# Patient Record
Sex: FEMALE | Race: WHITE | Employment: UNEMPLOYED | URBAN - METROPOLITAN AREA
[De-identification: names, ages, dates, MRNs, and addresses within clinical notes are randomized per-mention and may not be internally consistent; named-entity substitution may affect disease eponyms.]

---

## 2016-08-08 LAB
ANTIBODY SCREEN, EXTERNAL: NEGATIVE
CHLAMYDIA, EXTERNAL: NEGATIVE
HBSAG, EXTERNAL: NEGATIVE
HCT, EXTERNAL: 33.5
HGB, EXTERNAL: 11.3
HIV, EXTERNAL: NEGATIVE
N. GONORRHEA, EXTERNAL: NEGATIVE
RPR, EXTERNAL: NORMAL
RUBELLA, EXTERNAL: NORMAL
TYPE, ABO & RH, EXTERNAL: NORMAL

## 2017-01-02 ENCOUNTER — HOSPITAL ENCOUNTER (OUTPATIENT)
Age: 27
Setting detail: OBSERVATION
Discharge: HOME | End: 2017-01-03
Attending: STUDENT IN AN ORGANIZED HEALTH CARE EDUCATION/TRAINING PROGRAM | Admitting: STUDENT IN AN ORGANIZED HEALTH CARE EDUCATION/TRAINING PROGRAM
Payer: MEDICAID

## 2017-01-02 VITALS
BODY MASS INDEX: 19.46 KG/M2 | OXYGEN SATURATION: 98 % | HEART RATE: 66 BPM | RESPIRATION RATE: 16 BRPM | TEMPERATURE: 98.2 F | SYSTOLIC BLOOD PRESSURE: 107 MMHG | DIASTOLIC BLOOD PRESSURE: 61 MMHG | WEIGHT: 124 LBS | HEIGHT: 67 IN

## 2017-01-02 LAB
AMPHET UR QL SCN: NEGATIVE
APPEARANCE UR: CLEAR
BACTERIA URNS QL MICRO: ABNORMAL /HPF
BARBITURATES UR QL SCN: NEGATIVE
BENZODIAZ UR QL: NEGATIVE
BILIRUB UR QL: NEGATIVE
CANNABINOIDS UR QL SCN: NEGATIVE
COCAINE UR QL SCN: NEGATIVE
COLOR UR: ABNORMAL
DRUG SCRN COMMENT,DRGCM: ABNORMAL
EPITH CASTS URNS QL MICRO: ABNORMAL /LPF
GLUCOSE UR STRIP.AUTO-MCNC: NEGATIVE MG/DL
HGB UR QL STRIP: NEGATIVE
KETONES UR QL STRIP.AUTO: ABNORMAL MG/DL
LEUKOCYTE ESTERASE UR QL STRIP.AUTO: NEGATIVE
METHADONE UR QL: POSITIVE
NITRITE UR QL STRIP.AUTO: NEGATIVE
OPIATES UR QL: NEGATIVE
PCP UR QL: NEGATIVE
PH UR STRIP: 6 [PH] (ref 5–8)
PROT UR STRIP-MCNC: NEGATIVE MG/DL
RBC #/AREA URNS HPF: ABNORMAL /HPF (ref 0–5)
SP GR UR REFRACTOMETRY: 1.02 (ref 1–1.03)
UA: UC IF INDICATED,UAUC: ABNORMAL
UROBILINOGEN UR QL STRIP.AUTO: 1 EU/DL (ref 0.2–1)
WBC URNS QL MICRO: ABNORMAL /HPF (ref 0–4)

## 2017-01-02 PROCEDURE — 99218 HC RM OBSERVATION: CPT

## 2017-01-02 PROCEDURE — 59025 FETAL NON-STRESS TEST: CPT | Performed by: STUDENT IN AN ORGANIZED HEALTH CARE EDUCATION/TRAINING PROGRAM

## 2017-01-02 PROCEDURE — 81001 URINALYSIS AUTO W/SCOPE: CPT | Performed by: STUDENT IN AN ORGANIZED HEALTH CARE EDUCATION/TRAINING PROGRAM

## 2017-01-02 PROCEDURE — 75810000275 HC EMERGENCY DEPT VISIT NO LEVEL OF CARE

## 2017-01-02 PROCEDURE — 99284 EMERGENCY DEPT VISIT MOD MDM: CPT | Performed by: STUDENT IN AN ORGANIZED HEALTH CARE EDUCATION/TRAINING PROGRAM

## 2017-01-02 PROCEDURE — 80307 DRUG TEST PRSMV CHEM ANLYZR: CPT | Performed by: STUDENT IN AN ORGANIZED HEALTH CARE EDUCATION/TRAINING PROGRAM

## 2017-01-02 PROCEDURE — 87086 URINE CULTURE/COLONY COUNT: CPT | Performed by: STUDENT IN AN ORGANIZED HEALTH CARE EDUCATION/TRAINING PROGRAM

## 2017-01-02 PROCEDURE — 96372 THER/PROPH/DIAG INJ SC/IM: CPT | Performed by: STUDENT IN AN ORGANIZED HEALTH CARE EDUCATION/TRAINING PROGRAM

## 2017-01-02 RX ORDER — METHADONE HYDROCHLORIDE 10 MG/1
110 TABLET ORAL DAILY
COMMUNITY

## 2017-01-02 NOTE — IP AVS SNAPSHOT
Summary of Care Report The Summary of Care report has been created to help improve care coordination. Users with access to [employer] or [address] (Web-based application) may access additional patient information including the Discharge Summary. If you are not currently a [address] user and need more information, please call the number listed below in the [place] [address] section and ask to be connected with Medical Records. Facility Information Name Address Phone [address] [place] [id number] [phone] Patient Information Patient Name Sex  Felipa Serna ([id number]) Female 1990 Discharge Information Admitting Provider Service Area Unit Julita Virk, DO / [phone] [facility] 2 Labor & Delivery / [phone] Discharge Provider Discharge Date/Time Discharge Disposition Destination (none) 1/3/2017 02:15 (Pending) AHR (none) Patient Language Language ENGLISH [13] Problem List as of 1/3/2017  Date Reviewed: 3/6/2014 None You are allergic to the following Allergen Reactions Bactrim (Sulfamethoprim Ds) Rash Clindamycin Hives Current Discharge Medication List  
  
ASK your doctor about these medications Dose & Instructions Dispensing Information Comments HYDROcodone-acetaminophen 5-500 mg per tablet Commonly known as:  Doreatha Francisco Javier Dose:  1 Tab Take 1 Tab by mouth every six (6) hours as needed for Pain. Quantity:  20 Tab Refills:  0  
   
 methadone 10 mg tablet Commonly known as:  DOLOPHINE Dose:  110 mg Take 110 mg by mouth daily. Indications: OPIOID DEPENDENCE Refills:  0  
   
 polyethylene glycol 17 gram/dose powder Commonly known as:  Milka Reggie Dose:  17 g Take 17 g by mouth daily. 1 tablespoon with 8 oz of water daily Quantity:  255 g Refills:  0 PRENATAL DHA+COMPLETE PRENATAL -300 mg-mcg-mg Cmpk Generic drug:  PNV no.24-iron-folic acid-dha  
 Dose:  1 Tab Take 1 Tab by mouth daily. Refills:  0  
   
 TYLENOL ARTHRITIS PAIN 650 mg CR tablet Generic drug:  acetaminophen Dose:  650 mg Take 650 mg by mouth every six (6) hours as needed. Refills:  0 Follow-up Information Follow up With Details Comments Contact Info None   None (395) Patient stated that they have no PCP Discharge Instructions Please follow up with your doctor. Call tomorrow morning [phone] to make an appointment for Thursday. Dr. Paula Rodriguez will be expecting you. Week 37 of Your Pregnancy: Care Instructions Your Care Instructions You are near the end of your pregnancyand you're probably pretty uncomfortable. It may be harder to walk around. Lying down probably isn't comfortable either. You may have trouble getting to sleep or staying asleep. Most women deliver their babies between 40 and 41 weeks. This is a good time to think about packing a bag for the hospital with items you'll need. Then you'll be ready when labor starts. Follow-up care is a key part of your treatment and safety. Be sure to make and go to all appointments, and call your doctor if you are having problems. It's also a good idea to know your test results and keep a list of the medicines you take. How can you care for yourself at home? Learn about breastfeeding · Breastfeeding is best for your baby and good for you. · Breast milk has antibodies to help your baby fight infections. · Mothers who breastfeed often lose weight faster, because making milk burns calories. · Learning the best ways to hold your baby will make breastfeeding easier. · Let your partner bathe and diaper the baby to keep your partner from feeling left out. Snuggle together when you breastfeed. · You may want to learn how to use a breast pump and store your milk. · If you choose to bottle feed, make the feeding feel like breastfeeding so you can bond with your baby. Always hold your baby and the bottle. Do not prop bottles or let your baby fall asleep with a bottle. Learn about crying · It is common for babies to cry for 1 to 3 hours a day. Some cry more, some cry less. · Babies don't cry to make you upset or because you are a bad parent. · Crying is how your baby communicates. Your baby may be hungry; have gas; need a diaper change; or feel cold, warm, tired, lonely, or tense. Sometimes babies cry for unknown reasons. · If you respond to your baby's needs, he or she will learn to trust you. · Try to stay calm when your baby cries. Your baby may get more upset if he or she senses that you are upset. Know how to care for your  · Your baby's umbilical cord stump will drop off on its own, usually between 1 and 2 weeks. To care for your baby's umbilical cord area: ¨ Clean the area at the bottom of the cord 2 or 3 times a day. ¨ Pay special attention to the area where the cord attaches to the skin. ¨ Keep the diaper folded below the cord. ¨ Use a damp washcloth or cotton ball to sponge bathe your baby until the stump has come off. · Your baby's first dark stool is called meconium. After the meconium is passed, your baby will develop his or her own bowel pattern. ¨ Some babies, especially  babies, have several bowel movements a day. Others have one or two a day, or one every 2 to 3 days. ¨  babies often have loose, yellow stools. Formula-fed babies have more formed stools. ¨ If your baby's stools look like little pellets, he or she is constipated. After 2 days of constipation, call your baby's doctor. · If your baby will be circumcised, you can care for him at home. ¨ Gently rinse his penis with warm water after every diaper change.  Do not try to remove the film that forms on the penis. This film will go away on its own. Pat dry. ¨ Put petroleum ointment, such as Vaseline, on the area of the diaper that will touch your baby's penis. This will keep the diaper from sticking to your baby. ¨ Ask the doctor about giving your baby acetaminophen (Tylenol) for pain. Where can you learn more? Go to http://talon-jigar.info/. Enter 68 21 97 in the search box to learn more about \"Week 37 of Your Pregnancy: Care Instructions. \" Current as of: May 30, 2016 Content Version: 11.1 © 6845-1736 [employer]. Care instructions adapted under license by [employer] (which disclaims liability or warranty for this information). If you have questions about a medical condition or this instruction, always ask your healthcare professional. [place] [address] any warranty or liability for your use of this information. Counting Your Baby's Kicks: Care Instructions Your Care Instructions Counting your baby's kicks is one way your doctor can tell that your baby is healthy. Most womenespecially in a first pregnancyfeel their baby move for the first time between 16 and 22 weeks. The movement may feel like flutters rather than kicks. Your baby may move more at certain times of the day. When you are active, you may notice less kicking than when you are resting. At your prenatal visits, your doctor will ask whether the baby is active. In your last trimester, your doctor may ask you to count the number of times you feel your baby move. Follow-up care is a key part of your treatment and safety. Be sure to make and go to all appointments, and call your doctor if you are having problems. It's also a good idea to know your test results and keep a list of the medicines you take. How do you count fetal kicks?  
· A common method of checking your baby's movement is to count the number of kicks or moves you feel in 1 hour. Ten movements (such as kicks, flutters, or rolls) in 1 hour are normal. Some doctors suggest that you count in the morning until you get to 10 movements. Then you can quit for that day and start again the next day. · Pick your baby's most active time of day to count. This may be any time from morning to evening. · If you do not feel 10 movements in an hour, your baby may be sleeping. Wait for the next hour and count again. When should you call for help? Call your doctor now or seek immediate medical care if: 
· You noticed that your baby has stopped moving or is moving much less than normal. 
Watch closely for changes in your health, and be sure to contact your doctor if you have any problems. Where can you learn more? Go to http://talon-jigar.info/. Enter L285 in the search box to learn more about \"Counting Your Baby's Kicks: Care Instructions. \" Current as of: May 30, 2016 Content Version: 11.1 © 8110-7644 [employer]. Care instructions adapted under license by [employer] (which disclaims liability or warranty for this information). If you have questions about a medical condition or this instruction, always ask your healthcare professional. [address] any warranty or liability for your use of this information. Chart Review Routing History No Routing History on File

## 2017-01-02 NOTE — IP AVS SNAPSHOT
[address] [place] [address] [address] 
[phone] Patient: Farzana Thurston MRN: [id number] NUB:[birth year] You are allergic to the following Allergen Reactions Bactrim (Sulfamethoprim Ds) Rash Clindamycin Hives Recent Documentation Height Weight BMI OB Status Smoking Status 1.702 m 56.2 kg 19.42 kg/m2 Pregnant Current Every Day Smoker Unresulted Labs Order Current Status CULTURE, URINE In process Emergency Contacts Name Discharge Info Relation Home Work Mobile Ryley Baltazar [5] [phone] Ricky Fitzgerald  Other Relative [6] [phone] About your hospitalization You were admitted on:  January 2, 2017 You last received care in the:  [facility] 2 LABOR & DELIVERY You were discharged on:  January 3, 2017 Unit phone number:  [phone] Why you were hospitalized Your primary diagnosis was:  Not on File Providers Seen During Your Hospitalizations Provider Role Specialty Primary office phone Jolly Mishra DO Attending Provider Obstetrics & Gynecology [phone] Your Primary Care Physician (PCP) Primary Care Physician Office Phone Office Fax NONE ** None ** ** None ** Follow-up Information Follow up With Details Comments Contact Info None   None (395) Patient stated that they have no PCP Current Discharge Medication List  
  
ASK your doctor about these medications Dose & Instructions Dispensing Information Comments Morning Noon Evening Bedtime HYDROcodone-acetaminophen 5-500 mg per tablet Commonly known as:  Gale Salgado Your next dose is: Today, Tomorrow Other:  _________ Dose:  1 Tab Take 1 Tab by mouth every six (6) hours as needed for Pain. Quantity:  20 Tab Refills:  0  
     
   
   
   
  
 methadone 10 mg tablet Commonly known as:  DOLOPHINE  
 Your next dose is: Today, Tomorrow Other:  _________ Dose:  110 mg Take 110 mg by mouth daily. Indications: OPIOID DEPENDENCE Refills:  0  
     
   
   
   
  
 polyethylene glycol 17 gram/dose powder Commonly known as:  Moe Deck Your next dose is: Today, Tomorrow Other:  _________ Dose:  17 g Take 17 g by mouth daily. 1 tablespoon with 8 oz of water daily Quantity:  255 g Refills:  0 PRENATAL DHA+COMPLETE PRENATAL -300 mg-mcg-mg Cmpk Generic drug:  PNV no.24-iron-folic acid-dha Your next dose is: Today, Tomorrow Other:  _________ Dose:  1 Tab Take 1 Tab by mouth daily. Refills:  0  
     
   
   
   
  
 TYLENOL ARTHRITIS PAIN 650 mg CR tablet Generic drug:  acetaminophen Your next dose is: Today, Tomorrow Other:  _________ Dose:  650 mg Take 650 mg by mouth every six (6) hours as needed. Refills:  0 Discharge Instructions Please follow up with your doctor. Call tomorrow morning [phone] to make an appointment for Thursday. Dr. Suzette Newton will be expecting you. Week 37 of Your Pregnancy: Care Instructions Your Care Instructions You are near the end of your pregnancyand you're probably pretty uncomfortable. It may be harder to walk around. Lying down probably isn't comfortable either. You may have trouble getting to sleep or staying asleep. Most women deliver their babies between 40 and 41 weeks. This is a good time to think about packing a bag for the hospital with items you'll need. Then you'll be ready when labor starts. Follow-up care is a key part of your treatment and safety. Be sure to make and go to all appointments, and call your doctor if you are having problems. It's also a good idea to know your test results and keep a list of the medicines you take. How can you care for yourself at home? Learn about breastfeeding · Breastfeeding is best for your baby and good for you. · Breast milk has antibodies to help your baby fight infections. · Mothers who breastfeed often lose weight faster, because making milk burns calories. · Learning the best ways to hold your baby will make breastfeeding easier. · Let your partner bathe and diaper the baby to keep your partner from feeling left out. Snuggle together when you breastfeed. · You may want to learn how to use a breast pump and store your milk. · If you choose to bottle feed, make the feeding feel like breastfeeding so you can bond with your baby. Always hold your baby and the bottle. Do not prop bottles or let your baby fall asleep with a bottle. Learn about crying · It is common for babies to cry for 1 to 3 hours a day. Some cry more, some cry less. · Babies don't cry to make you upset or because you are a bad parent. · Crying is how your baby communicates. Your baby may be hungry; have gas; need a diaper change; or feel cold, warm, tired, lonely, or tense. Sometimes babies cry for unknown reasons. · If you respond to your baby's needs, he or she will learn to trust you. · Try to stay calm when your baby cries. Your baby may get more upset if he or she senses that you are upset. Know how to care for your  · Your baby's umbilical cord stump will drop off on its own, usually between 1 and 2 weeks. To care for your baby's umbilical cord area: ¨ Clean the area at the bottom of the cord 2 or 3 times a day. ¨ Pay special attention to the area where the cord attaches to the skin. ¨ Keep the diaper folded below the cord. ¨ Use a damp washcloth or cotton ball to sponge bathe your baby until the stump has come off. · Your baby's first dark stool is called meconium. After the meconium is passed, your baby will develop his or her own bowel pattern.  
¨ Some babies, especially  babies, have several bowel movements a day. Others have one or two a day, or one every 2 to 3 days. ¨  babies often have loose, yellow stools. Formula-fed babies have more formed stools. ¨ If your baby's stools look like little pellets, he or she is constipated. After 2 days of constipation, call your baby's doctor. · If your baby will be circumcised, you can care for him at home. ¨ Gently rinse his penis with warm water after every diaper change. Do not try to remove the film that forms on the penis. This film will go away on its own. Pat dry. ¨ Put petroleum ointment, such as Vaseline, on the area of the diaper that will touch your baby's penis. This will keep the diaper from sticking to your baby. ¨ Ask the doctor about giving your baby acetaminophen (Tylenol) for pain. Where can you learn more? Go to http://talonGreen Biofactoryjigar.info/. Enter 12 49 97 in the search box to learn more about \"Week 37 of Your Pregnancy: Care Instructions. \" Current as of: May 30, 2016 Content Version: 11.1 © 1050-8711 [employer]. Care instructions adapted under license by [employer] (which disclaims liability or warranty for this information). If you have questions about a medical condition or this instruction, always ask your healthcare professional. [place] [address] any warranty or liability for your use of this information. Counting Your Baby's Kicks: Care Instructions Your Care Instructions Counting your baby's kicks is one way your doctor can tell that your baby is healthy. Most womenespecially in a first pregnancyfeel their baby move for the first time between 16 and 22 weeks. The movement may feel like flutters rather than kicks. Your baby may move more at certain times of the day. When you are active, you may notice less kicking than when you are resting. At your prenatal visits, your doctor will ask whether the baby is active. In your last trimester, your doctor may ask you to count the number of times you feel your baby move. Follow-up care is a key part of your treatment and safety. Be sure to make and go to all appointments, and call your doctor if you are having problems. It's also a good idea to know your test results and keep a list of the medicines you take. How do you count fetal kicks? · A common method of checking your baby's movement is to count the number of kicks or moves you feel in 1 hour. Ten movements (such as kicks, flutters, or rolls) in 1 hour are normal. Some doctors suggest that you count in the morning until you get to 10 movements. Then you can quit for that day and start again the next day. · Pick your baby's most active time of day to count. This may be any time from morning to evening. · If you do not feel 10 movements in an hour, your baby may be sleeping. Wait for the next hour and count again. When should you call for help? Call your doctor now or seek immediate medical care if: 
· You noticed that your baby has stopped moving or is moving much less than normal. 
Watch closely for changes in your health, and be sure to contact your doctor if you have any problems. Where can you learn more? Go to http://talon-jigar.info/. Enter I669 in the search box to learn more about \"Counting Your Baby's Kicks: Care Instructions. \" Current as of: May 30, 2016 Content Version: 11.1 © 2109-4020 [employer]. Care instructions adapted under license by [employer] (which disclaims liability or warranty for this information). If you have questions about a medical condition or this instruction, always ask your healthcare professional. [place] [address] any warranty or liability for your use of this information. Discharge Orders None Introducing [facility]!    
 Ranjith Edwards introduces [facility] patient portal. Now you can access parts of your medical record, email your doctor's office, and request medication refills online. 1. In your internet browser, go to [email or web address] 2. Click on the First Time User? Click Here link in the Sign In box. You will see the New Member Sign Up page. 3. Enter your BetterYou Access Code exactly as it appears below. You will not need to use this code after youve completed the sign-up process. If you do not sign up before the expiration date, you must request a new code. · BetterYou Access Code: [email or web address] Expires: 4/3/2017  2:18 AM 
 
4. Enter the last four digits of your Social Security Number (xxxx) and Date of Birth (mm/dd/yyyy) as indicated and click Submit. You will be taken to the next sign-up page. 5. Create a BetterYou ID. This will be your BetterYou login ID and cannot be changed, so think of one that is secure and easy to remember. 6. Create a BetterYou password. You can change your password at any time. 7. Enter your Password Reset Question and Answer. This can be used at a later time if you forget your password. 8. Enter your e-mail address. You will receive e-mail notification when new information is available in [address] 9. Click Sign Up. You can now view and download portions of your medical record. 10. Click the Download Summary menu link to download a portable copy of your medical information. If you have questions, please visit the Frequently Asked Questions section of the BetterYou website. Remember, BetterYou is NOT to be used for urgent needs. For medical emergencies, dial 911. Now available from your iPhone and Android! General Information Please provide this summary of care documentation to your next provider. Patient Signature:  ____________________________________________________________ Date:  ____________________________________________________________  
  
[facility]  Provider Signature: ____________________________________________________________ Date:  ____________________________________________________________

## 2017-01-02 NOTE — IP AVS SNAPSHOT
Current Discharge Medication List  
  
ASK your doctor about these medications Dose & Instructions Dispensing Information Comments Morning Noon Evening Bedtime HYDROcodone-acetaminophen 5-500 mg per tablet Commonly known as:  Ramiro Munoz Your next dose is: Today, Tomorrow Other:  ____________ Dose:  1 Tab Take 1 Tab by mouth every six (6) hours as needed for Pain. Quantity:  20 Tab Refills:  0  
     
   
   
   
  
 methadone 10 mg tablet Commonly known as:  DOLOPHINE Your next dose is: Today, Tomorrow Other:  ____________ Dose:  110 mg Take 110 mg by mouth daily. Indications: OPIOID DEPENDENCE Refills:  0  
     
   
   
   
  
 polyethylene glycol 17 gram/dose powder Commonly known as:  Josephine Fail Your next dose is: Today, Tomorrow Other:  ____________ Dose:  17 g Take 17 g by mouth daily. 1 tablespoon with 8 oz of water daily Quantity:  255 g Refills:  0 PRENATAL DHA+COMPLETE PRENATAL -300 mg-mcg-mg Cmpk Generic drug:  PNV no.24-iron-folic acid-dha Your next dose is: Today, Tomorrow Other:  ____________ Dose:  1 Tab Take 1 Tab by mouth daily. Refills:  0  
     
   
   
   
  
 TYLENOL ARTHRITIS PAIN 650 mg CR tablet Generic drug:  acetaminophen Your next dose is: Today, Tomorrow Other:  ____________ Dose:  650 mg Take 650 mg by mouth every six (6) hours as needed. Refills:  0

## 2017-01-03 PROCEDURE — 74011250636 HC RX REV CODE- 250/636: Performed by: STUDENT IN AN ORGANIZED HEALTH CARE EDUCATION/TRAINING PROGRAM

## 2017-01-03 PROCEDURE — 99218 HC RM OBSERVATION: CPT

## 2017-01-03 PROCEDURE — 87081 CULTURE SCREEN ONLY: CPT | Performed by: STUDENT IN AN ORGANIZED HEALTH CARE EDUCATION/TRAINING PROGRAM

## 2017-01-03 PROCEDURE — 74011000250 HC RX REV CODE- 250: Performed by: STUDENT IN AN ORGANIZED HEALTH CARE EDUCATION/TRAINING PROGRAM

## 2017-01-03 RX ADMIN — LIDOCAINE HYDROCHLORIDE 1 G: 10 INJECTION, SOLUTION EPIDURAL; INFILTRATION; INTRACAUDAL; PERINEURAL at 02:02

## 2017-01-03 NOTE — PROGRESS NOTES
2200  Patient arrived on unit, urine sample obtained and patient placed on monitor. Discussed plan of care, let Dr Samy Mayo know she was here on the unit. Per Dr Samy Mayo ok to hydrate orally. Per patient she takes 110mg methadone daily via a methadone clinic. Dr Samy Mayo aware. Urine lab sent. 4150  Per Dr Samy Mayo, in light or recent lab results, give one dose of rocephin 1gm IM. Also obtain GBS swab.      0225  Discharge instructions given to patient, patient verbalized understanding.

## 2017-01-03 NOTE — PROGRESS NOTES
12- dr Carrie Chase notified of pt uc pattern, urine screen positive for methadone and pain scale, new orders to check pt cx and d/c to home with outpatient follow up  On 1-5-17.

## 2017-01-03 NOTE — DISCHARGE INSTRUCTIONS
Please follow up with your doctor. Call tomorrow morning [phone] to make an appointment for Thursday. Dr. Flower Lilly will be expecting you. Week 37 of Your Pregnancy: Care Instructions  Your Care Instructions    You are near the end of your pregnancy--and you're probably pretty uncomfortable. It may be harder to walk around. Lying down probably isn't comfortable either. You may have trouble getting to sleep or staying asleep. Most women deliver their babies between 40 and 41 weeks. This is a good time to think about packing a bag for the hospital with items you'll need. Then you'll be ready when labor starts. Follow-up care is a key part of your treatment and safety. Be sure to make and go to all appointments, and call your doctor if you are having problems. It's also a good idea to know your test results and keep a list of the medicines you take. How can you care for yourself at home? Learn about breastfeeding  · Breastfeeding is best for your baby and good for you. · Breast milk has antibodies to help your baby fight infections. · Mothers who breastfeed often lose weight faster, because making milk burns calories. · Learning the best ways to hold your baby will make breastfeeding easier. · Let your partner bathe and diaper the baby to keep your partner from feeling left out. Snuggle together when you breastfeed. · You may want to learn how to use a breast pump and store your milk. · If you choose to bottle feed, make the feeding feel like breastfeeding so you can bond with your baby. Always hold your baby and the bottle. Do not prop bottles or let your baby fall asleep with a bottle. Learn about crying  · It is common for babies to cry for 1 to 3 hours a day. Some cry more, some cry less. · Babies don't cry to make you upset or because you are a bad parent. · Crying is how your baby communicates.  Your baby may be hungry; have gas; need a diaper change; or feel cold, warm, tired, lonely, or tense. Sometimes babies cry for unknown reasons. · If you respond to your baby's needs, he or she will learn to trust you. · Try to stay calm when your baby cries. Your baby may get more upset if he or she senses that you are upset. Know how to care for your   · Your baby's umbilical cord stump will drop off on its own, usually between 1 and 2 weeks. To care for your baby's umbilical cord area:  ¨ Clean the area at the bottom of the cord 2 or 3 times a day. ¨ Pay special attention to the area where the cord attaches to the skin. ¨ Keep the diaper folded below the cord. ¨ Use a damp washcloth or cotton ball to sponge bathe your baby until the stump has come off. · Your baby's first dark stool is called meconium. After the meconium is passed, your baby will develop his or her own bowel pattern. ¨ Some babies, especially  babies, have several bowel movements a day. Others have one or two a day, or one every 2 to 3 days. ¨  babies often have loose, yellow stools. Formula-fed babies have more formed stools. ¨ If your baby's stools look like little pellets, he or she is constipated. After 2 days of constipation, call your baby's doctor. · If your baby will be circumcised, you can care for him at home. ¨ Gently rinse his penis with warm water after every diaper change. Do not try to remove the film that forms on the penis. This film will go away on its own. Pat dry. ¨ Put petroleum ointment, such as Vaseline, on the area of the diaper that will touch your baby's penis. This will keep the diaper from sticking to your baby. ¨ Ask the doctor about giving your baby acetaminophen (Tylenol) for pain. Where can you learn more? Go to http://talon-jigar.info/. Enter 08 54 [id number] in the search box to learn more about \"Week 37 of Your Pregnancy: Care Instructions. \"  Current as of: May 30, 2016  Content Version: 11.1  © 8845-6837 [employer].  Care instructions adapted under license by [employer] (which disclaims liability or warranty for this information). If you have questions about a medical condition or this instruction, always ask your healthcare professional. [address] any warranty or liability for your use of this information. Counting Your Baby's Kicks: Care Instructions  Your Care Instructions  Counting your baby's kicks is one way your doctor can tell that your baby is healthy. Most women--especially in a first pregnancy--feel their baby move for the first time between 16 and 22 weeks. The movement may feel like flutters rather than kicks. Your baby may move more at certain times of the day. When you are active, you may notice less kicking than when you are resting. At your prenatal visits, your doctor will ask whether the baby is active. In your last trimester, your doctor may ask you to count the number of times you feel your baby move. Follow-up care is a key part of your treatment and safety. Be sure to make and go to all appointments, and call your doctor if you are having problems. It's also a good idea to know your test results and keep a list of the medicines you take. How do you count fetal kicks? · A common method of checking your baby's movement is to count the number of kicks or moves you feel in 1 hour. Ten movements (such as kicks, flutters, or rolls) in 1 hour are normal. Some doctors suggest that you count in the morning until you get to 10 movements. Then you can quit for that day and start again the next day. · Pick your baby's most active time of day to count. This may be any time from morning to evening. · If you do not feel 10 movements in an hour, your baby may be sleeping. Wait for the next hour and count again. When should you call for help?   Call your doctor now or seek immediate medical care if:  · You noticed that your baby has stopped moving or is moving much less than normal.  Watch closely for changes in your health, and be sure to contact your doctor if you have any problems. Where can you learn more? Go to http://talon-jigar.info/. Enter D703 in the search box to learn more about \"Counting Your Baby's Kicks: Care Instructions. \"  Current as of: May 30, 2016  Content Version: 11.1  © 6489-2088 [employer]. Care instructions adapted under license by [employer] (which disclaims liability or warranty for this information). If you have questions about a medical condition or this instruction, always ask your healthcare professional. [address] any warranty or liability for your use of this information.

## 2017-01-04 ENCOUNTER — ANESTHESIA EVENT (OUTPATIENT)
Dept: LABOR AND DELIVERY | Age: 27
DRG: 540 | End: 2017-01-04
Payer: MEDICAID

## 2017-01-04 ENCOUNTER — HOSPITAL ENCOUNTER (INPATIENT)
Age: 27
LOS: 3 days | Discharge: HOME | DRG: 540 | End: 2017-01-07
Attending: STUDENT IN AN ORGANIZED HEALTH CARE EDUCATION/TRAINING PROGRAM | Admitting: OBSTETRICS & GYNECOLOGY
Payer: MEDICAID

## 2017-01-04 ENCOUNTER — ANESTHESIA (OUTPATIENT)
Dept: LABOR AND DELIVERY | Age: 27
DRG: 540 | End: 2017-01-04
Payer: MEDICAID

## 2017-01-04 PROBLEM — Z34.90 PREGNANT: Status: ACTIVE | Noted: 2017-01-04

## 2017-01-04 LAB
AMPHET UR QL SCN: NEGATIVE
BACTERIA SPEC CULT: NORMAL
BARBITURATES UR QL SCN: NEGATIVE
BASOPHILS # BLD AUTO: 0 K/UL (ref 0–0.1)
BASOPHILS # BLD: 0 % (ref 0–1)
BENZODIAZ UR QL: NEGATIVE
CANNABINOIDS UR QL SCN: NEGATIVE
CC UR VC: NORMAL
COCAINE UR QL SCN: NEGATIVE
DRUG SCRN COMMENT,DRGCM: ABNORMAL
EOSINOPHIL # BLD: 0.1 K/UL (ref 0–0.4)
EOSINOPHIL NFR BLD: 1 % (ref 0–7)
ERYTHROCYTE [DISTWIDTH] IN BLOOD BY AUTOMATED COUNT: 13.3 % (ref 11.5–14.5)
HCT VFR BLD AUTO: 39.1 % (ref 35–47)
HGB BLD-MCNC: 14 G/DL (ref 11.5–16)
LYMPHOCYTES # BLD AUTO: 13 % (ref 12–49)
LYMPHOCYTES # BLD: 2.8 K/UL (ref 0.8–3.5)
MCH RBC QN AUTO: 28.7 PG (ref 26–34)
MCHC RBC AUTO-ENTMCNC: 35.8 G/DL (ref 30–36.5)
MCV RBC AUTO: 80.3 FL (ref 80–99)
METHADONE UR QL: POSITIVE
MONOCYTES # BLD: 1 K/UL (ref 0–1)
MONOCYTES NFR BLD AUTO: 5 % (ref 5–13)
NEUTS SEG # BLD: 17.6 K/UL (ref 1.8–8)
NEUTS SEG NFR BLD AUTO: 81 % (ref 32–75)
OPIATES UR QL: NEGATIVE
PCP UR QL: NEGATIVE
PLATELET # BLD AUTO: 335 K/UL (ref 150–400)
RBC # BLD AUTO: 4.87 M/UL (ref 3.8–5.2)
SERVICE CMNT-IMP: NORMAL
WBC # BLD AUTO: 21.5 K/UL (ref 3.6–11)

## 2017-01-04 PROCEDURE — 80307 DRUG TEST PRSMV CHEM ANLYZR: CPT | Performed by: OBSTETRICS & GYNECOLOGY

## 2017-01-04 PROCEDURE — 74011250636 HC RX REV CODE- 250/636: Performed by: OBSTETRICS & GYNECOLOGY

## 2017-01-04 PROCEDURE — 74011250636 HC RX REV CODE- 250/636

## 2017-01-04 PROCEDURE — 75410000003 HC RECOV DEL/VAG/CSECN EA 0.5 HR: Performed by: OBSTETRICS & GYNECOLOGY

## 2017-01-04 PROCEDURE — 77030034850

## 2017-01-04 PROCEDURE — 74011000250 HC RX REV CODE- 250

## 2017-01-04 PROCEDURE — 74011000250 HC RX REV CODE- 250: Performed by: OBSTETRICS & GYNECOLOGY

## 2017-01-04 PROCEDURE — 36415 COLL VENOUS BLD VENIPUNCTURE: CPT | Performed by: OBSTETRICS & GYNECOLOGY

## 2017-01-04 PROCEDURE — 85025 COMPLETE CBC W/AUTO DIFF WBC: CPT | Performed by: OBSTETRICS & GYNECOLOGY

## 2017-01-04 PROCEDURE — 76010000391 HC C SECN FIRST 1 HR: Performed by: OBSTETRICS & GYNECOLOGY

## 2017-01-04 PROCEDURE — 77010026064 HC OXYGEN INFANT MED AIR MIN: Performed by: OBSTETRICS & GYNECOLOGY

## 2017-01-04 PROCEDURE — 74011250637 HC RX REV CODE- 250/637: Performed by: OBSTETRICS & GYNECOLOGY

## 2017-01-04 PROCEDURE — 74011250636 HC RX REV CODE- 250/636: Performed by: ANESTHESIOLOGY

## 2017-01-04 PROCEDURE — 99218 HC RM OBSERVATION: CPT

## 2017-01-04 PROCEDURE — 77030014125 HC TY EPDRL BBMI -B: Performed by: ANESTHESIOLOGY

## 2017-01-04 PROCEDURE — 77010026065 HC OXYGEN MINIMUM MEDICAL AIR: Performed by: OBSTETRICS & GYNECOLOGY

## 2017-01-04 PROCEDURE — 88307 TISSUE EXAM BY PATHOLOGIST: CPT | Performed by: OBSTETRICS & GYNECOLOGY

## 2017-01-04 PROCEDURE — 99281 EMR DPT VST MAYX REQ PHY/QHP: CPT | Performed by: STUDENT IN AN ORGANIZED HEALTH CARE EDUCATION/TRAINING PROGRAM

## 2017-01-04 PROCEDURE — 75810000275 HC EMERGENCY DEPT VISIT NO LEVEL OF CARE

## 2017-01-04 PROCEDURE — 75410000002 HC LABOR FEE PER 1 HR: Performed by: OBSTETRICS & GYNECOLOGY

## 2017-01-04 PROCEDURE — 76060000078 HC EPIDURAL ANESTHESIA: Performed by: OBSTETRICS & GYNECOLOGY

## 2017-01-04 PROCEDURE — 65270000029 HC RM PRIVATE

## 2017-01-04 PROCEDURE — 76815 OB US LIMITED FETUS(S): CPT | Performed by: OBSTETRICS & GYNECOLOGY

## 2017-01-04 RX ORDER — NALOXONE HYDROCHLORIDE 0.4 MG/ML
0.4 INJECTION, SOLUTION INTRAMUSCULAR; INTRAVENOUS; SUBCUTANEOUS AS NEEDED
Status: DISCONTINUED | OUTPATIENT
Start: 2017-01-04 | End: 2017-01-07 | Stop reason: HOSPADM

## 2017-01-04 RX ORDER — OXYTOCIN 10 [USP'U]/ML
INJECTION, SOLUTION INTRAMUSCULAR; INTRAVENOUS AS NEEDED
Status: DISCONTINUED | OUTPATIENT
Start: 2017-01-04 | End: 2017-01-04 | Stop reason: HOSPADM

## 2017-01-04 RX ORDER — SODIUM CHLORIDE, SODIUM LACTATE, POTASSIUM CHLORIDE, CALCIUM CHLORIDE 600; 310; 30; 20 MG/100ML; MG/100ML; MG/100ML; MG/100ML
125 INJECTION, SOLUTION INTRAVENOUS CONTINUOUS
Status: DISCONTINUED | OUTPATIENT
Start: 2017-01-04 | End: 2017-01-04

## 2017-01-04 RX ORDER — SODIUM CHLORIDE 0.9 % (FLUSH) 0.9 %
5-10 SYRINGE (ML) INJECTION AS NEEDED
Status: DISCONTINUED | OUTPATIENT
Start: 2017-01-04 | End: 2017-01-07 | Stop reason: HOSPADM

## 2017-01-04 RX ORDER — BUPIVACAINE HYDROCHLORIDE 2.5 MG/ML
INJECTION, SOLUTION EPIDURAL; INFILTRATION; INTRACAUDAL AS NEEDED
Status: DISCONTINUED | OUTPATIENT
Start: 2017-01-04 | End: 2017-01-04 | Stop reason: HOSPADM

## 2017-01-04 RX ORDER — DOCUSATE SODIUM 100 MG/1
100 CAPSULE, LIQUID FILLED ORAL 2 TIMES DAILY
Status: DISCONTINUED | OUTPATIENT
Start: 2017-01-04 | End: 2017-01-07 | Stop reason: HOSPADM

## 2017-01-04 RX ORDER — CEFAZOLIN SODIUM IN 0.9 % NACL 2 G/50 ML
2 INTRAVENOUS SOLUTION, PIGGYBACK (ML) INTRAVENOUS ONCE
Status: COMPLETED | OUTPATIENT
Start: 2017-01-04 | End: 2017-01-04

## 2017-01-04 RX ORDER — SODIUM CHLORIDE 0.9 % (FLUSH) 0.9 %
5-10 SYRINGE (ML) INJECTION EVERY 8 HOURS
Status: DISCONTINUED | OUTPATIENT
Start: 2017-01-04 | End: 2017-01-04

## 2017-01-04 RX ORDER — NALOXONE HYDROCHLORIDE 0.4 MG/ML
0.4 INJECTION, SOLUTION INTRAMUSCULAR; INTRAVENOUS; SUBCUTANEOUS AS NEEDED
Status: DISCONTINUED | OUTPATIENT
Start: 2017-01-04 | End: 2017-01-04 | Stop reason: SDUPTHER

## 2017-01-04 RX ORDER — BUPIVACAINE HYDROCHLORIDE 5 MG/ML
10 INJECTION, SOLUTION EPIDURAL; INTRACAUDAL ONCE
Status: DISCONTINUED | OUTPATIENT
Start: 2017-01-04 | End: 2017-01-04

## 2017-01-04 RX ORDER — LIDOCAINE HYDROCHLORIDE AND EPINEPHRINE 20; 5 MG/ML; UG/ML
INJECTION, SOLUTION EPIDURAL; INFILTRATION; INTRACAUDAL; PERINEURAL AS NEEDED
Status: DISCONTINUED | OUTPATIENT
Start: 2017-01-04 | End: 2017-01-04 | Stop reason: HOSPADM

## 2017-01-04 RX ORDER — SODIUM CHLORIDE 0.9 % (FLUSH) 0.9 %
5-10 SYRINGE (ML) INJECTION EVERY 8 HOURS
Status: DISCONTINUED | OUTPATIENT
Start: 2017-01-04 | End: 2017-01-04 | Stop reason: HOSPADM

## 2017-01-04 RX ORDER — KETOROLAC TROMETHAMINE 30 MG/ML
30 INJECTION, SOLUTION INTRAMUSCULAR; INTRAVENOUS
Status: DISCONTINUED | OUTPATIENT
Start: 2017-01-04 | End: 2017-01-04

## 2017-01-04 RX ORDER — METHADONE HYDROCHLORIDE 10 MG/ML
110 CONCENTRATE ORAL DAILY
Status: DISCONTINUED | OUTPATIENT
Start: 2017-01-04 | End: 2017-01-07 | Stop reason: HOSPADM

## 2017-01-04 RX ORDER — ACETAMINOPHEN 325 MG/1
650 TABLET ORAL
Status: DISCONTINUED | OUTPATIENT
Start: 2017-01-04 | End: 2017-01-07 | Stop reason: HOSPADM

## 2017-01-04 RX ORDER — FENTANYL/BUPIVACAINE/NS/PF 2-1250MCG
PREFILLED PUMP RESERVOIR EPIDURAL
Status: DISCONTINUED
Start: 2017-01-04 | End: 2017-01-04

## 2017-01-04 RX ORDER — HYDROCODONE BITARTRATE AND ACETAMINOPHEN 5; 325 MG/1; MG/1
2 TABLET ORAL
Status: DISCONTINUED | OUTPATIENT
Start: 2017-01-04 | End: 2017-01-07 | Stop reason: HOSPADM

## 2017-01-04 RX ORDER — SODIUM CHLORIDE 0.9 % (FLUSH) 0.9 %
5-10 SYRINGE (ML) INJECTION AS NEEDED
Status: DISCONTINUED | OUTPATIENT
Start: 2017-01-04 | End: 2017-01-04

## 2017-01-04 RX ORDER — METHADONE HYDROCHLORIDE 10 MG/1
110 TABLET ORAL DAILY
Status: DISCONTINUED | OUTPATIENT
Start: 2017-01-04 | End: 2017-01-04

## 2017-01-04 RX ORDER — SODIUM CHLORIDE, SODIUM LACTATE, POTASSIUM CHLORIDE, CALCIUM CHLORIDE 600; 310; 30; 20 MG/100ML; MG/100ML; MG/100ML; MG/100ML
125 INJECTION, SOLUTION INTRAVENOUS CONTINUOUS
Status: DISCONTINUED | OUTPATIENT
Start: 2017-01-04 | End: 2017-01-07 | Stop reason: HOSPADM

## 2017-01-04 RX ORDER — NALOXONE HYDROCHLORIDE 0.4 MG/ML
0.4 INJECTION, SOLUTION INTRAMUSCULAR; INTRAVENOUS; SUBCUTANEOUS AS NEEDED
Status: DISCONTINUED | OUTPATIENT
Start: 2017-01-04 | End: 2017-01-04 | Stop reason: HOSPADM

## 2017-01-04 RX ORDER — ZOLPIDEM TARTRATE 5 MG/1
5 TABLET ORAL
Status: DISCONTINUED | OUTPATIENT
Start: 2017-01-05 | End: 2017-01-07 | Stop reason: HOSPADM

## 2017-01-04 RX ORDER — SODIUM CHLORIDE, SODIUM LACTATE, POTASSIUM CHLORIDE, CALCIUM CHLORIDE 600; 310; 30; 20 MG/100ML; MG/100ML; MG/100ML; MG/100ML
1000 INJECTION, SOLUTION INTRAVENOUS CONTINUOUS
Status: DISCONTINUED | OUTPATIENT
Start: 2017-01-04 | End: 2017-01-04 | Stop reason: HOSPADM

## 2017-01-04 RX ORDER — SODIUM CHLORIDE, SODIUM LACTATE, POTASSIUM CHLORIDE, CALCIUM CHLORIDE 600; 310; 30; 20 MG/100ML; MG/100ML; MG/100ML; MG/100ML
100 INJECTION, SOLUTION INTRAVENOUS CONTINUOUS
Status: DISCONTINUED | OUTPATIENT
Start: 2017-01-04 | End: 2017-01-04

## 2017-01-04 RX ORDER — IBUPROFEN 800 MG/1
800 TABLET ORAL EVERY 8 HOURS
Status: DISCONTINUED | OUTPATIENT
Start: 2017-01-04 | End: 2017-01-07 | Stop reason: HOSPADM

## 2017-01-04 RX ORDER — MORPHINE SULFATE 0.5 MG/ML
INJECTION, SOLUTION EPIDURAL; INTRATHECAL; INTRAVENOUS AS NEEDED
Status: DISCONTINUED | OUTPATIENT
Start: 2017-01-04 | End: 2017-01-04 | Stop reason: HOSPADM

## 2017-01-04 RX ORDER — OXYTOCIN IN 5 % DEXTROSE 30/500 ML
1-25 PLASTIC BAG, INJECTION (ML) INTRAVENOUS
Status: DISCONTINUED | OUTPATIENT
Start: 2017-01-04 | End: 2017-01-04

## 2017-01-04 RX ORDER — ONDANSETRON 2 MG/ML
INJECTION INTRAMUSCULAR; INTRAVENOUS AS NEEDED
Status: DISCONTINUED | OUTPATIENT
Start: 2017-01-04 | End: 2017-01-04 | Stop reason: HOSPADM

## 2017-01-04 RX ORDER — SODIUM CHLORIDE 0.9 % (FLUSH) 0.9 %
5-10 SYRINGE (ML) INJECTION EVERY 8 HOURS
Status: DISCONTINUED | OUTPATIENT
Start: 2017-01-04 | End: 2017-01-07 | Stop reason: HOSPADM

## 2017-01-04 RX ORDER — CEFAZOLIN SODIUM IN 0.9 % NACL 2 G/50 ML
INTRAVENOUS SOLUTION, PIGGYBACK (ML) INTRAVENOUS
Status: DISCONTINUED
Start: 2017-01-04 | End: 2017-01-04

## 2017-01-04 RX ORDER — KETOROLAC TROMETHAMINE 30 MG/ML
30 INJECTION, SOLUTION INTRAMUSCULAR; INTRAVENOUS EVERY 6 HOURS
Status: COMPLETED | OUTPATIENT
Start: 2017-01-04 | End: 2017-01-05

## 2017-01-04 RX ORDER — SODIUM CHLORIDE 0.9 % (FLUSH) 0.9 %
5-10 SYRINGE (ML) INJECTION AS NEEDED
Status: DISCONTINUED | OUTPATIENT
Start: 2017-01-04 | End: 2017-01-04 | Stop reason: HOSPADM

## 2017-01-04 RX ORDER — FENTANYL/BUPIVACAINE/NS/PF 2-1250MCG
1-16 PREFILLED PUMP RESERVOIR EPIDURAL CONTINUOUS
Status: DISCONTINUED | OUTPATIENT
Start: 2017-01-04 | End: 2017-01-04

## 2017-01-04 RX ORDER — SIMETHICONE 80 MG
80 TABLET,CHEWABLE ORAL AS NEEDED
Status: DISCONTINUED | OUTPATIENT
Start: 2017-01-04 | End: 2017-01-07 | Stop reason: HOSPADM

## 2017-01-04 RX ORDER — DIPHENHYDRAMINE HCL 25 MG
25 CAPSULE ORAL
Status: DISCONTINUED | OUTPATIENT
Start: 2017-01-04 | End: 2017-01-07 | Stop reason: HOSPADM

## 2017-01-04 RX ORDER — OXYTOCIN IN 5 % DEXTROSE 30/500 ML
1-25 PLASTIC BAG, INJECTION (ML) INTRAVENOUS
Status: DISCONTINUED | OUTPATIENT
Start: 2017-01-04 | End: 2017-01-07 | Stop reason: HOSPADM

## 2017-01-04 RX ORDER — SODIUM CHLORIDE, SODIUM LACTATE, POTASSIUM CHLORIDE, CALCIUM CHLORIDE 600; 310; 30; 20 MG/100ML; MG/100ML; MG/100ML; MG/100ML
125 INJECTION, SOLUTION INTRAVENOUS CONTINUOUS
Status: DISPENSED | OUTPATIENT
Start: 2017-01-04 | End: 2017-01-05

## 2017-01-04 RX ORDER — OXYTOCIN/RINGER'S LACTATE 20/1000 ML
125-500 PLASTIC BAG, INJECTION (ML) INTRAVENOUS ONCE
Status: ACTIVE | OUTPATIENT
Start: 2017-01-04 | End: 2017-01-04

## 2017-01-04 RX ORDER — ONDANSETRON 2 MG/ML
4 INJECTION INTRAMUSCULAR; INTRAVENOUS
Status: DISCONTINUED | OUTPATIENT
Start: 2017-01-04 | End: 2017-01-04 | Stop reason: HOSPADM

## 2017-01-04 RX ADMIN — DOCUSATE SODIUM 100 MG: 100 CAPSULE ORAL at 10:25

## 2017-01-04 RX ADMIN — KETOROLAC TROMETHAMINE 30 MG: 30 INJECTION, SOLUTION INTRAMUSCULAR at 06:28

## 2017-01-04 RX ADMIN — ONDANSETRON 4 MG: 2 INJECTION INTRAMUSCULAR; INTRAVENOUS at 02:21

## 2017-01-04 RX ADMIN — MORPHINE SULFATE 2.5 MG: 0.5 INJECTION, SOLUTION EPIDURAL; INTRATHECAL; INTRAVENOUS at 02:37

## 2017-01-04 RX ADMIN — KETOROLAC TROMETHAMINE 30 MG: 30 INJECTION, SOLUTION INTRAMUSCULAR at 18:53

## 2017-01-04 RX ADMIN — CEFAZOLIN 2 G: 1 INJECTION, POWDER, FOR SOLUTION INTRAMUSCULAR; INTRAVENOUS; PARENTERAL at 02:21

## 2017-01-04 RX ADMIN — BUPIVACAINE HYDROCHLORIDE 20 ML: 5 INJECTION, SOLUTION EPIDURAL; INTRACAUDAL; PERINEURAL at 02:52

## 2017-01-04 RX ADMIN — OXYTOCIN 40 UNITS: 10 INJECTION, SOLUTION INTRAMUSCULAR; INTRAVENOUS at 02:36

## 2017-01-04 RX ADMIN — KETOROLAC TROMETHAMINE 30 MG: 30 INJECTION, SOLUTION INTRAMUSCULAR at 11:38

## 2017-01-04 RX ADMIN — MORPHINE SULFATE 2.5 MG: 0.5 INJECTION, SOLUTION EPIDURAL; INTRATHECAL; INTRAVENOUS at 02:36

## 2017-01-04 RX ADMIN — METHADONE HYDROCHLORIDE 110 MG: 10 CONCENTRATE ORAL at 09:14

## 2017-01-04 RX ADMIN — BUPIVACAINE HYDROCHLORIDE 10 ML: 2.5 INJECTION, SOLUTION EPIDURAL; INFILTRATION; INTRACAUDAL at 00:59

## 2017-01-04 RX ADMIN — FENTANYL 0.2 MG/100ML-BUPIV 0.125%-NACL 0.9% EPIDURAL INJ 10 ML/HR: 2/0.125 SOLUTION at 01:12

## 2017-01-04 RX ADMIN — SODIUM CHLORIDE, SODIUM LACTATE, POTASSIUM CHLORIDE, AND CALCIUM CHLORIDE 125 ML/HR: 600; 310; 30; 20 INJECTION, SOLUTION INTRAVENOUS at 01:33

## 2017-01-04 RX ADMIN — SODIUM CHLORIDE, SODIUM LACTATE, POTASSIUM CHLORIDE, AND CALCIUM CHLORIDE: 600; 310; 30; 20 INJECTION, SOLUTION INTRAVENOUS at 00:29

## 2017-01-04 RX ADMIN — CEFAZOLIN 2 G: 1 INJECTION, POWDER, FOR SOLUTION INTRAMUSCULAR; INTRAVENOUS; PARENTERAL at 02:07

## 2017-01-04 RX ADMIN — LIDOCAINE HYDROCHLORIDE AND EPINEPHRINE 15 ML: 20; 5 INJECTION, SOLUTION EPIDURAL; INFILTRATION; INTRACAUDAL; PERINEURAL at 02:18

## 2017-01-04 RX ADMIN — Medication 5 ML: at 22:00

## 2017-01-04 RX ADMIN — HYDROCODONE BITARTRATE AND ACETAMINOPHEN 2 TABLET: 5; 325 TABLET ORAL at 16:12

## 2017-01-04 NOTE — PROGRESS NOTES
0030  Pt offered nitrous oxide for pain relief in labor. Pt states to RN and staff, \"i dont like this! It feels like im suffocating!   Someone needs to help me!\"

## 2017-01-04 NOTE — DISCHARGE SUMMARY
Patient ID:  [facility]  [id number]  31 y.o.  1990    Admit Date: 2017    Discharge Date:     Admitting Physician: Alistair Harden MD    Attending Physician: Henrique Perez DO    Admission Diagnoses: Rule out Labor  Pregnant    Procedures for this admission: Procedure(s):   SECTION    Discharge Diagnoses: Same as above with primary LTCS producing a viable infant. Information for the patient's : Darleen Beltran, Female [[id number]]   One Minute Apgar: 8 (Filed from Delivery Summary)  Five  Minute Apgar: 9 (Filed from Delivery Summary)   4lb 14oz marissa breech  Scant fluid  Normal uterine anatomy     Discharge Disposition:  good    Discharge Condition:  good    Additional Diagnoses: IUP 37wks active labor marissa breech methadone maint. scant care/ poor compliance. Maternal Labs: No results found for: HBSAGEXT, HIVEXT, RUBELLAEXT, RPREXT, GRBSEXT    Cord Blood Results:   Information for the patient's : Darleen Beltran, Female [[id number]]   No results found for: PCTABR, PCTDIG, BILI, ABORH          History of Present Illness:   OB History      Para Term  AB TAB SAB Ectopic Multiple Living    1                 Admitted for active labor. Hospital Course:   Patient was admitted as above and delivered via primary LTCS . Please the chart for details. The postpartum course was unremarkable. She was deemed stable for discharge home on day 3.     Follow-up Care: 7-10days        Signed:  Alistair Harden MD  2017  2:55 AM

## 2017-01-04 NOTE — L&D DELIVERY NOTE
Operative Note    Name: Zachary Andre   Medical Record Number: [id number]   YOB: 1990  Today's Date: 2017      Pre-operative Diagnosis: IUP 37wks active labor marissa breech methadone use poor compliance/ scant care    Post-operative Diagnosis: same, delivered    Procedure: primary LTCS    Surgeon(s):  Tino Valera MD    Anesthesia: epidural     EBL: 500cc    Prophylactic Antibiotics: Ancef  DVT Prophylaxis: Sequential Compression Devices         Fetal Description: katz     Birth Information:   Information for the patient's : Preethi Colindres, Female [[id number]]   Delivery of a 2.211 kg Female [1] infant on 2017 at 2:34 AM. Apgars were 8 and 9. Umbilical Cord:     Umbilical Cord Events:     Placenta:  removal with  appearance.     Amniotic Fluid Volume:  Oligohydramic     Amniotic Fluid Description:  Clear        Umbilical Cord: 3 vessels present    Placenta:  manual removal    Additional intraoperative findings: marissa breech viable female    Specimens: placenta/ cord gas 3.01           Complications: none    Stable to PACU    Tino Valera MD  2017  3:01 AM

## 2017-01-04 NOTE — PROGRESS NOTES
Problem: Lactation Care Plan  Goal: *Infant latching appropriately  Outcome: Progressing Towards Goal  Pt will successfully establish breastfeeding by feeding in response to infant's early feeding cues and/or to offer breast every 2-3 hours. Ways to obtain a deep latch and seek comfortable positioning shared, aware to keep log of feedings/output. Goal: *Weight loss less than 10% of birth weight  Outcome: Progressing Towards Goal  Encouraged mom to attempt feeding every 2-3 hours in the first couple of days. Looking for 8-12 feedings in 24 hours. Don't limit baby at breast, allow baby to come of breast on it's own. Baby may want to feed more often then every 2-3 hours. Baby may not feed that often, but feedings should be attempted. If baby doesn't nurse,  Mom can hand express drops of colostrum and drip into baby's mouth, or  give to baby by finger feeding, cup feeding,or spoon feeding. Encouraged skin to skin. Problem: Patient Education: Go to Patient Education Activity  Goal: Patient/Family Education  Outcome: Progressing Towards Goal  Discussed with mother her plan for feeding. Reviewed the benefits of exclusive breast milk feeding during the hospital stay. Informed her of the risks of using formula to supplement in the first few days of life as well as the benefits of successful breast milk feeding; referred her to the Breastfeeding booklet about this information. She acknowledges understanding of information reviewed and states that it is her plan to breast/formula feed her infant. Will support her choice and offer additional information as needed. Hand Expression Education:  Mom taught how to manually hand express her colostrum. Emphasized the importance of providing infant with valuable colostrum as infant rests skin to skin at breast.  Aware to avoid extended periods of non-feeding.   Aware to offer 10-20+ drops of colostrum every 2-3 hours until infant is latching and nursing effectively. Taught the rationale behind this low tech but highly effective evidence based practice. Pt chooses to do both breast and bottle. Discussed effects of early supplementation on breastfeeding success; may decrease breastmilk production and supply, increase risk for pathological engorgement, baby may develop preference for faster flow from bottles vs breast, and baby's stomach can be stretched if larger volumes of formula are given. Instructed mother to offer mother's milk first so that baby gets mother's immunity and antibodies. Comments:   Pt will successfully establish breastfeeding by feeding in response to early feeding cues   or wake every 3h, will obtain deep latch, and will keep log of feedings/output. Taught to BF at hunger cues and or q 2-3 hrs and to offer 10-20 drops of hand expressed colostrum at any non-feeds. Breast Assessment  Left Breast: Small   Left Nipple: Everted, Intact, Short  Right Breast: Small   Right Nipple: Everted, Intact, Short, Inverted (Tip of nipple slightly inverted. )  Breast- Feeding Assessment  Attends Breast-Feeding Classes: No  Breast-Feeding Experience: No  Breast Trauma/Surgery: No  Type/Quality:  (Mother states she is on methadone. She has not  baby yet - baby has had bottles of formula in nursery. )  Lactation Consultant Visits  Breast-Feedings: Good  ([facility] present for baby's first breastfeeding. Mother able to hand express large drops colostrum to entice baby to latch on. Baby licked nipple initially but then was able to latch well and had good rhythmic suck.)  Mother/Infant Observation  Mother Observation: Alignment, Breast comfortable, Close hold, Holds breast, Recognizes feeding cues  Infant Observation: Audible swallows, Frenulum checked (Short tongue can extend to lower gumline. Staff RN contacted ENT doctor)  LATCH Documentation  Latch: Grasps breast, tongue down, lips flanged, rhythmic sucking  Audible Swallowing: A few with stimulation  Type of Nipple: Everted (after stimulation)  Comfort (Breast/Nipple): Soft/non-tender  Hold (Positioning): Full assist, teach one side, mother does other, staff holds  [facility]

## 2017-01-04 NOTE — ANESTHESIA PROCEDURE NOTES
Epidural Block    Start time: 1/4/2017 12:51 AM  End time: 1/4/2017 1:02 AM  Performed by: Darby Roblero  Authorized by: Darby Roblero     Pre-Procedure  Indication: labor epidural    Preanesthetic Checklist: patient identified, risks and benefits discussed, anesthesia consent, timeout performed and anesthesia consent    Timeout Time: 00:52        Epidural:   Patient position:  Seated  Prep region:  Lumbar  Prep: Betadine    Location:  L3-4    Needle and Epidural Catheter:   Needle Type:  Tuohy  Needle Gauge:  17 G  Injection Technique:  Loss of resistance using air  Attempts:  1  Catheter Size:  18 G  Events: no paresthesia and negative aspiration test    Test Dose:  Lidocaine 1.5% w/ epi and negative    Assessment:   Catheter Secured:  Tegaderm and tape  Insertion:  Uncomplicated  Patient tolerance:  Patient tolerated the procedure well with no immediate complications

## 2017-01-04 NOTE — ANESTHESIA POSTPROCEDURE EVALUATION
Post-Anesthesia Evaluation and Assessment    Patient: Hemant Villasenor MRN: [id number]  SSN: [id number]    YOB: 1990  Age: 31 y.o. Sex: female       Cardiovascular Function/Vital Signs  Visit Vitals    BP 92/41    Pulse (!) 59    Temp 36.3 °C (97.4 °F)    Resp 12    Ht 5' 7\" (1.702 m)    Wt 56.2 kg (124 lb)    SpO2 96%    Breastfeeding Unknown    BMI 19.42 kg/m2       Patient is status post epidural anesthesia for Procedure(s):   SECTION. Nausea/Vomiting: None    Postoperative hydration reviewed and adequate. Pain:  Pain Scale 1: Numeric (0 - 10) (17)  Pain Intensity 1: 0 (17)   Managed    Neurological Status: At baseline    Mental Status and Level of Consciousness: Arousable    Pulmonary Status:       Adequate oxygenation and airway patent    Complications related to anesthesia: None    Post-anesthesia assessment completed.  No concerns    Signed By: Sumaya Valdez MD     2017

## 2017-01-04 NOTE — PROGRESS NOTES
0111 verbal and bedside report received from CORNELIO Gardiner RN. Patient care assumed at this time. [address] and contacted patient's counselor Janelle Herrera. Counselor verified 110 mg liquid methadone daily. MD aware and will place order for medication daily. 1130 Patient tolerating PO diet and fluids well. Discontinued IV fluids. Patient ambulated to bathroom to perform ross care. Ambulated well without difficulty. Patient states she is uncomfortable with ambulation. 1738 Patient ambulated to bathroom without difficulty; able to void easily. 1910 Bedside and Verbal shift change report given to CORNELIO Hernández RN (oncoming nurse) by Keerthi Epstein RN (offgoing nurse). Report included the following information SBAR, Kardex, Intake/Output, MAR, Accordion, Recent Results and Med Rec Status.

## 2017-01-04 NOTE — IP AVS SNAPSHOT
[address] 
[phone] Patient: Royal Pearson MRN: [id number] DDL:5/2/3365 You are allergic to the following Allergen Reactions Bactrim (Sulfamethoprim Ds) Rash Clindamycin Hives Recent Documentation Height Weight Breastfeeding? BMI OB Status Smoking Status 1.702 m 56.2 kg Unknown 19.42 kg/m2 Recent pregnancy Current Every Day Smoker Unresulted Labs Order Current Status SAMPLE TO BLOOD BANK In process Emergency Contacts Name Discharge Info Relation Home Work Mobile Morenaelliott Austin [17]   [phone] About your hospitalization You were admitted on:  January 4, 2017 You last received care in the:  [facility] 2 LABOR & DELIVERY You were discharged on:  January 7, 2017 Unit phone number:  [phone] Why you were hospitalized Your primary diagnosis was:  Not on File Your diagnoses also included:  Pregnant Providers Seen During Your Hospitalizations Provider Role Specialty Primary office phone Danielle Florence DO Attending Provider Obstetrics & Gynecology [phone] Yvette Lopez MD Attending Provider Obstetrics & Gynecology [phone] Your Primary Care Physician (PCP) Primary Care Physician Office Phone Office Fax NONE ** None ** ** None ** Follow-up Information Follow up With Details Comments Contact Info None   None (395) Patient stated that they have no PCP Current Discharge Medication List  
  
CONTINUE these medications which have CHANGED Dose & Instructions Dispensing Information Comments Morning Noon Evening Bedtime * HYDROcodone-acetaminophen 5-500 mg per tablet Commonly known as:  Branson Reus What changed:  Another medication with the same name was added. Make sure you understand how and when to take each. Your next dose is: Today, Tomorrow Other:  _________ Dose:  1 Tab Take 1 Tab by mouth every six (6) hours as needed for Pain. Quantity:  20 Tab Refills:  0  
     
   
   
   
  
 * HYDROcodone-acetaminophen 5-325 mg per tablet Commonly known as:  Abhishek Veraner What changed: You were already taking a medication with the same name, and this prescription was added. Make sure you understand how and when to take each. Your next dose is: Today, Tomorrow Other:  _________ Dose:  1 Tab Take 1 Tab by mouth every four (4) hours as needed. Max Daily Amount: 6 Tabs. Quantity:  20 Tab Refills:  0  
     
   
   
   
  
 * Notice: This list has 2 medication(s) that are the same as other medications prescribed for you. Read the directions carefully, and ask your doctor or other care provider to review them with you. CONTINUE these medications which have NOT CHANGED Dose & Instructions Dispensing Information Comments Morning Noon Evening Bedtime  
 methadone 10 mg tablet Commonly known as:  DOLOPHINE Your next dose is: Today, Tomorrow Other:  _________ Dose:  110 mg Take 110 mg by mouth daily. Indications: OPIOID DEPENDENCE Refills:  0  
     
   
   
   
  
 polyethylene glycol 17 gram/dose powder Commonly known as:  Dena Floor Your next dose is: Today, Tomorrow Other:  _________ Dose:  17 g Take 17 g by mouth daily. 1 tablespoon with 8 oz of water daily Quantity:  255 g Refills:  0 PRENATAL DHA+COMPLETE PRENATAL -300 mg-mcg-mg Cmpk Generic drug:  PNV no.24-iron-folic acid-dha Your next dose is: Today, Tomorrow Other:  _________ Dose:  1 Tab Take 1 Tab by mouth daily. Refills:  0  
     
   
   
   
  
 TYLENOL ARTHRITIS PAIN 650 mg CR tablet Generic drug:  acetaminophen Your next dose is: Today, Tomorrow Other:  _________ Dose:  650 mg Take 650 mg by mouth every six (6) hours as needed. Refills:  0 Where to Get Your Medications Information on where to get these meds will be given to you by the nurse or doctor. ! Ask your nurse or doctor about these medications HYDROcodone-acetaminophen 5-325 mg per tablet Discharge Instructions Discharge Instructions for  Section Patient ID: 
Wolf Yusuf [id number] 
79 y.o. 
1990 Take Home Medications Please call office to see Dr Bairon Robbins in 7-10 days, [phone] Continue taking your prenatal vitamins if you are breastfeeding. Follow-up care is a key part of your treatment and safety. Be sure to keep all your scheduled appointments Activity 1. Avoid heavy lifting greater than 10lbs for 2 weeks after your surgery 2. Pelvic rest for 6 weeks, ie, nothing in your vagina for 6 weeks  (no intercourse, tampons, or douching). No tubs for 6 weeks. 3. No driving for 2 weeks and until you are no longer taking prescription pain medications and you can put your foot on the break in a hurry 4. Limit climbing stairs to only when necessary the first 1-2 weeks. Hold the railing and do not carry your baby up and downstairs at first for safety 5. You may walk as tolerated, though be care to not over-do it. Walking will assist in overall healing, decrease constipation and bloating. [place] feel better more quickly with some daily movement. Diet Regular diet as tolerated Wound care There are several types of incision closures. 1. If you have metal staples in place when you leave the hospital, please call your doctors office to schedule the staple removal as directed at discharge. 2. If you have steri strips covering your incision, you may remove them as they fall off or be sure to remove them about one week after your surgery. Removing them in the shower may be easier. 3. If you have Dermabond, or skin glue, covering your incision, it will fall off slowly in the next few weeks. You may remove it as it begins to peel off. Additional wound care 1. Clear or reddish drainage from your incision is normal.  It's best to leave it open to the air, but if there is drainage, you may cover the incision lightly with gauze, preferably without tape. 2.  Keep the incision clean and dry. 3. Numbness of the skin at or around your incision is normal and the feeling usually returns gradually. 4. Call your doctor if you have increasing drainage from your incision, an unpleasant odor, red streaks, an increase in pain, or if it appears to open. Pain Management 1. Continue prescription pain medication as written by discharging physician 2. Over the counter medications such as Tylenol and ibuprofen (Motrin or Advil) are also ideal.  These may be taken together or in alternating doses. You may  take the maximum dose:  Motrin or Advil (generic ibuprofen), either 3 tablets every 6 hours or 4 tablets every 8 hours. Your prescription medication conntains a narcotic mixed with Tylenol,so  you should not take any extra Tylenol or acetaminophen until you have reduced your prescription pain medication. The maximum dose is Tylenol or acetominophen 1000 mg every 6 hours (equivalent to 2 Extra Strength Tylenols every 6 hours). 3. After a few days, begin to replace the prescription medication with over the counter medications. Use the prescription medication if needed for more severe pain or at night. The prescription medication can be addictive if overused. 4. Add heating pad or sitz baths as needed. Constipation 1. Constipation is normal after surgery, especially while taking prescription narcotic pain medication. 2. Over the counter remedies including ducosate (Colace), take 1-2 capsules 1-2 times daily for soft stool as needed.   You may also add/ try milk of magnesia or rectal remedies such as Dulcolax or Fleets enema. Recovery: What to Expect at [facility] 1. Fatigue is expected. Try to rest when you can and don't worry about doing housework or other tasks which can wait. 2. The soreness in your incision will improve significantly over the first 2 weeks, but it may take 6-8 weeks before you are completely recovered. 3. Back pain or general body aches or muscle soreness are expected and should improve with acetominophen or ibuprofen. 4. Leg swelling due to pregnancy and/or IV fluids given in the hospital will take about two weeks to resolve. 5. Most women experience some form of the \"Baby Blues\" after having a baby. Feeling emotional, tearful, frustrated, anxious, sad, and irritable some of the time is normal and go away after about 2 weeks. Adequate rest and help from your family will help. Take breaks from caring for the baby. Call your doctor if your symptoms seem severe, last more than 2 weeks, or seem to be getting worse instead of better. Get help immediately if you have thoughts of wanting to hurt yourself or others! Call your doctor or seek immediate medical care if you have: 
Heavy vaginal bleeding, soaking through one or more pads an hour for several hours. Foul-smelling discharge from your vagina or incision. Consistent nausea and vomiting and cannot keep fluids down. Consistent pain that does not get better after you take pain medicine. Sudden chest pain and shortness of breath Signs of a blood clot: pain/ swelling/ increasing redness in your lower extremeties Signs of infection: increased pain in your abdomen or vaginal area; red streaks, warmth, or tenderness of your breasts; fever of 100.5 F or greater Discharge Orders None [facility] Announcement We are excited to announce that we are making your provider's discharge notes available to you in [employer].   You will see these notes when they are completed and signed by the physician that discharged you from your recent hospital stay. If you have any questions or concerns about any information you see in [facility], please call the Health Information Department where you were seen or reach out to your Primary Care Provider for more information about your plan of care. Introducing [facility]! Cristianoloboshayy Phucarben introduces [facility] patient portal. Now you can access parts of your medical record, email your doctor's office, and request medication refills online. 1. In your internet browser, go to [email or web address] 2. Click on the First Time User? Click Here link in the Sign In box. You will see the New Member Sign Up page. 3. Enter your [facility] Access Code exactly as it appears below. You will not need to use this code after youve completed the sign-up process. If you do not sign up before the expiration date, you must request a new code. · [facility] Access Code: [email or web address] Expires: 4/3/2017  2:18 AM 
 
4. Enter the last four digits of your Social Security Number (xxxx) and Date of Birth (mm/dd/yyyy) as indicated and click Submit. You will be taken to the next sign-up page. 5. Create a [facility] ID. This will be your [facility] login ID and cannot be changed, so think of one that is secure and easy to remember. 6. Create a [facility] password. You can change your password at any time. 7. Enter your Password Reset Question and Answer. This can be used at a later time if you forget your password. 8. Enter your e-mail address. You will receive e-mail notification when new information is available in [address] 9. Click Sign Up. You can now view and download portions of your medical record. 10. Click the Download Summary menu link to download a portable copy of your medical information.  
 
If you have questions, please visit the Frequently Asked Questions section of the [employer]. Remember, MyChart is NOT to be used for urgent needs. For medical emergencies, dial 911. Now available from your iPhone and Android! General Information Please provide this summary of care documentation to your next provider. Patient Signature:  ____________________________________________________________ Date:  ____________________________________________________________  
  
Brooklynn Delta Provider Signature:  ____________________________________________________________ Date:  ____________________________________________________________

## 2017-01-04 NOTE — IP AVS SNAPSHOT
Summary of Care Report The Summary of Care report has been created to help improve care coordination. Users with access to [employer] or [address] (Web-based application) may access additional patient information including the Discharge Summary. If you are not currently a [address] user and need more information, please call the number listed below in the [place] [address] section and ask to be connected with Medical Records. Facility Information Name Address Phone [place] [phone] Patient Information Patient Name Sex  Boyd Paula ([id number]) Female 1990 Discharge Information Admitting Provider Service Area Unit Isaac Pandey MD / [phone] [facility] Labor & Delivery / [phone] Discharge Provider Discharge Date/Time Discharge Disposition Destination (none) 2017 (Pending) AHR (none) Patient Language Language ENGLISH [13] Problem List as of 2017  Date Reviewed: 3/6/2014 Codes Priority Class Noted - Resolved Pregnant ICD-10-CM: Z33.1 ICD-9-CM: V22.2   2017 - Present You are allergic to the following Allergen Reactions Bactrim (Sulfamethoprim Ds) Rash Clindamycin Hives Current Discharge Medication List  
  
CONTINUE these medications which have CHANGED Dose & Instructions Dispensing Information Comments * HYDROcodone-acetaminophen 5-500 mg per tablet Commonly known as:  Rachid Matute What changed:  Another medication with the same name was added. Make sure you understand how and when to take each. Dose:  1 Tab Take 1 Tab by mouth every six (6) hours as needed for Pain. Quantity:  20 Tab Refills:  0  
   
 * HYDROcodone-acetaminophen 5-325 mg per tablet Commonly known as:  Fadi Cortez  
 What changed: You were already taking a medication with the same name, and this prescription was added. Make sure you understand how and when to take each. Dose:  1 Tab Take 1 Tab by mouth every four (4) hours as needed. Max Daily Amount: 6 Tabs. Quantity:  20 Tab Refills:  0  
   
 * Notice: This list has 2 medication(s) that are the same as other medications prescribed for you. Read the directions carefully, and ask your doctor or other care provider to review them with you. CONTINUE these medications which have NOT CHANGED Dose & Instructions Dispensing Information Comments  
 methadone 10 mg tablet Commonly known as:  DOLOPHINE Dose:  110 mg Take 110 mg by mouth daily. Indications: OPIOID DEPENDENCE Refills:  0  
   
 polyethylene glycol 17 gram/dose powder Commonly known as:  Alonza Schimke Dose:  17 g Take 17 g by mouth daily. 1 tablespoon with 8 oz of water daily Quantity:  255 g Refills:  0 PRENATAL DHA+COMPLETE PRENATAL -300 mg-mcg-mg Cmpk Generic drug:  PNV no.24-iron-folic acid-dha  
 Dose:  1 Tab Take 1 Tab by mouth daily. Refills:  0  
   
 TYLENOL ARTHRITIS PAIN 650 mg CR tablet Generic drug:  acetaminophen Dose:  650 mg Take 650 mg by mouth every six (6) hours as needed. Refills:  0 Surgery Information ID Date/Time Status Primary Surgeon All Procedures Location [id number] 2017 Unposted   [facility] - DO NOT SCHEDULE    
 [id number] 2017 Posted Tony Jensen MD  [facility] L[facility]D OR Follow-up Information Follow up With Details Comments Contact Info None   None (395) Patient stated that they have no PCP Discharge Instructions Discharge Instructions for  Section Patient ID: 
Aamir Smith [id number] 
88 y.o. 
1990 Take Home Medications Please call office to see Dr Jazmine Brown in 7-10 days, [phone] Continue taking your prenatal vitamins if you are breastfeeding. Follow-up care is a key part of your treatment and safety. Be sure to keep all your scheduled appointments Activity 1. Avoid heavy lifting greater than 10lbs for 2 weeks after your surgery 2. Pelvic rest for 6 weeks, ie, nothing in your vagina for 6 weeks  (no intercourse, tampons, or douching). No tubs for 6 weeks. 3. No driving for 2 weeks and until you are no longer taking prescription pain medications and you can put your foot on the break in a hurry 4. Limit climbing stairs to only when necessary the first 1-2 weeks. Hold the railing and do not carry your baby up and downstairs at first for safety 5. You may walk as tolerated, though be care to not over-do it. Walking will assist in overall healing, decrease constipation and bloating. [place] feel better more quickly with some daily movement. Diet Regular diet as tolerated Wound care There are several types of incision closures. 1. If you have metal staples in place when you leave the hospital, please call your doctors office to schedule the staple removal as directed at discharge. 2. If you have steri strips covering your incision, you may remove them as they fall off or be sure to remove them about one week after your surgery. Removing them in the shower may be easier. 3. If you have Dermabond, or skin glue, covering your incision, it will fall off slowly in the next few weeks. You may remove it as it begins to peel off. Additional wound care 1. Clear or reddish drainage from your incision is normal.  It's best to leave it open to the air, but if there is drainage, you may cover the incision lightly with gauze, preferably without tape. 2.  Keep the incision clean and dry. 3. Numbness of the skin at or around your incision is normal and the feeling usually returns gradually.    
4. Call your doctor if you have increasing drainage from your incision, an unpleasant odor, red streaks, an increase in pain, or if it appears to open. Pain Management 1. Continue prescription pain medication as written by discharging physician 2. Over the counter medications such as Tylenol and ibuprofen (Motrin or Advil) are also ideal.  These may be taken together or in alternating doses. You may  take the maximum dose:  Motrin or Advil (generic ibuprofen), either 3 tablets every 6 hours or 4 tablets every 8 hours. Your prescription medication conntains a narcotic mixed with Tylenol,so  you should not take any extra Tylenol or acetaminophen until you have reduced your prescription pain medication. The maximum dose is Tylenol or acetominophen 1000 mg every 6 hours (equivalent to 2 Extra Strength Tylenols every 6 hours). 3. After a few days, begin to replace the prescription medication with over the counter medications. Use the prescription medication if needed for more severe pain or at night. The prescription medication can be addictive if overused. 4. Add heating pad or sitz baths as needed. Constipation 1. Constipation is normal after surgery, especially while taking prescription narcotic pain medication. 2. Over the counter remedies including ducosate (Colace), take 1-2 capsules 1-2 times daily for soft stool as needed. You may also add/ try milk of magnesia or rectal remedies such as Dulcolax or Fleets enema. Recovery: What to Expect at [facility] 1. Fatigue is expected. Try to rest when you can and don't worry about doing housework or other tasks which can wait. 2. The soreness in your incision will improve significantly over the first 2 weeks, but it may take 6-8 weeks before you are completely recovered. 3. Back pain or general body aches or muscle soreness are expected and should improve with acetominophen or ibuprofen. 4. Leg swelling due to pregnancy and/or IV fluids given in the hospital will take about two weeks to resolve. 5. Most women experience some form of the \"Baby Blues\" after having a baby. Feeling emotional, tearful, frustrated, anxious, sad, and irritable some of the time is normal and go away after about 2 weeks. Adequate rest and help from your family will help. Take breaks from caring for the baby. Call your doctor if your symptoms seem severe, last more than 2 weeks, or seem to be getting worse instead of better. Get help immediately if you have thoughts of wanting to hurt yourself or others! Call your doctor or seek immediate medical care if you have: 
Heavy vaginal bleeding, soaking through one or more pads an hour for several hours. Foul-smelling discharge from your vagina or incision. Consistent nausea and vomiting and cannot keep fluids down. Consistent pain that does not get better after you take pain medicine. Sudden chest pain and shortness of breath Signs of a blood clot: pain/ swelling/ increasing redness in your lower extremeties Signs of infection: increased pain in your abdomen or vaginal area; red streaks, warmth, or tenderness of your breasts; fever of 100.5 F or greater Chart Review Routing History No Routing History on File

## 2017-01-04 NOTE — ANESTHESIA PREPROCEDURE EVALUATION
Anesthetic History   No history of anesthetic complications            Review of Systems / Medical History  Patient summary reviewed and pertinent labs reviewed    Pulmonary          Smoker         Neuro/Psych   Within defined limits           Cardiovascular  Within defined limits                Exercise tolerance: >4 METS     GI/Hepatic/Renal  Within defined limits              Endo/Other  Within defined limits           Other Findings              Physical Exam    Airway  Mallampati: II  TM Distance: 4 - 6 cm  Neck ROM: normal range of motion   Mouth opening: Normal     Cardiovascular  Regular rate and rhythm,  S1 and S2 normal,  no murmur, click, rub, or gallop  Rhythm: regular  Rate: normal         Dental  No notable dental hx       Pulmonary                 Abdominal         Other Findings            Anesthetic Plan    ASA: 2  Anesthesia type: epidural      Post-op pain plan if not by surgeon: indwelling epidural catheter      Anesthetic plan and risks discussed with: Patient      Charting completed after epidural placement.

## 2017-01-04 NOTE — OP NOTES
Ino Carranza [facility] 79   [address] [facility], [address]   OP NOTE       Name:  Leonor Alva   MR#:  [id number]   :  1990   Account #:  [de-identified]    Surgery Date:  2017   Date of Adm:  2017       PREOPERATIVE DIAGNOSES:    1. Intrauterine pregnancy at 37 weeks and 4 days. 2. Active labor. 3. Javid Baston breech presentation. 4. Methadone use with history of heroin abuse. 5. Poor compliance with scant care. POSTOPERATIVE DIAGNOSES:   1. Intrauterine pregnancy at 37 weeks and 4 days, delivered. 2. Active labor. 3. Javid Baston breech presentation. 4. Methadone use with history of heroin abuse. 5. Poor compliance with scant care. 6. Oligodyramnios    PROCEDURE PERFORMED: Primary low transverse . SURGEON: Samir Escobar MD.    ANESTHESIA: Epidural.    ESTIMATED BLOOD LOSS: 500 mL. INTRAVENOUS FLUIDS: 1000 mL crystalloid. URINE OUTPUT: 50 mL, clear. COMPLICATIONS: None. SPECIMENS: Placenta to Pathology, cord gas of 7.25. INTRAOPERATIVE FINDINGS: Viable female infant in marissa breech   presentation with Apgars of 8 and 9, weight 4 pounds 14 ounces. Normal ovaries, uterus and tubes. Normal uterine anatomy. INDICATIONS: The patient is a 79-year-old G1, P0, with an   intrauterine pregnancy at 37 weeks and 4 days, whose pregnancy has   been complicated by methadone use with a history of heroin abuse   and poor compliance and scant care. She presented to labor and   delivery in active labor and was noted to be in marissa breech   presentation after epidural placement and nearly complete cervical   dilatation. DESCRIPTION OF PROCEDURE: The patient was taken to the   operating room where her epidural anesthesia was dosed to a surgical   level. She was prepped and draped in normal sterile fashion in dorsal   supine position with leftward tilt.  A Pfannenstiel skin incision was made   2 fingerbreadths above the pubic symphysis and carried down to the   underlying layer of fascia. The fascia was incised at the midline and   the incision extended bilaterally. The superior aspect of the fascial   incision was grasped with Kocher clamps and elevated, and the   underlying rectus muscles were dissected off both bluntly and sharply. The same was done for the inferior aspect of the fascial incision. The   rectus muscles were  at the midline and the abdominal cavity   was entered sharply with excellent visualization of underlying   structures in the bowel, bladder, and uterus. The bowels were packed. The vesicouterine peritoneum was identified and entered sharply, and   the bladder flap was created manually. The bladder blade was   readjusted. A low transverse incision was made on the uterus and scant clear fluid was   noted at amniotomy. The fluid was clear. A viable female infant was   delivered from marissa breech presentation with routine breech   maneuvers. Her cord was clamped and cut, and she was handed off to   the awaiting NICU team. The placenta was delivered with manual   assistance and the uterus was exteriorized and cleared of clots and   debris. The uterine incision was repaired with 0 Vicryl in a running   locked fashion. A small 2 cm extension on the left-hand side was also   repaired with 0 Vicryl in a running locked fashion. One additional   figure-of-eight suture was placed at the midline to ensure hemostasis. The uterus was returned to the abdomen and the gutters were cleared   of clots and debris. The hysterotomy was re-inspected and noted to be   hemostatic. The peritoneum was reapproximated with 2-0 Vicryl in a   running fashion. The rectus muscles were reapproximated with 3-0   Vicryl in a box stitch fashion. The superior and inferior fascial planes   were inspected and noted to be hemostatic. The fascia was   reapproximated with 0 Vicryl in a running fashion.  The subcutaneous   tissue was less than 1 cm thick and the skin was closed with 4-0   Monocryl in a subcuticular fashion. Approximately 20 mL of 0.5% plain   bupivacaine were injected throughout the incision and Steri-Strips were   placed. The patient tolerated the procedure well. All sponge, lap, needle   counts were correct x2, and she was taken to the recovery room in   stable condition. She received Ancef 2 g IV prior to incision and SCDs   upon entry into the operating room.         Mike Davila MD       / [facility].   D:  01/04/2017   03:07   T:  01/04/2017   03:52   Job #:  [id number]

## 2017-01-04 NOTE — H&P
History & Physical    Name: Hemant Villasenor MRN: [id number]  SSN: [id number]    YOB: 1990  Age: 31 y.o. Sex: female        Subjective:     Estimated Date of Delivery: 17  OB History      Para Term  AB TAB SAB Ectopic Multiple Living    1                   Ms. Riccardo Garcia is admitted with pregnancy at 37w4d for active labor. Prenatal course was complicated by poor compliance and scant care, h/o heroin abuse and on methdone from local clinic daily 110mg QD, did not see MFM as instructed earlier in pregnancy, no office care since early ish weeks. . Please see prenatal records for details. Past Medical History   Diagnosis Date    ADD (attention deficit disorder with hyperactivity)     Anxiety     Cellulitis      History reviewed. No pertinent past surgical history. Social History     Occupational History    Not on file. Social History Main Topics    Smoking status: Current Every Day Smoker     Packs/day: 1.00    Smokeless tobacco: Never Used    Alcohol use No    Drug use: Yes     Special: Opiates    Sexual activity: Yes     Partners: Male     Birth control/ protection: None     Family History   Problem Relation Age of Onset    Cancer Mother     Hypertension Mother     Hypertension Father        Allergies   Allergen Reactions    Bactrim [Sulfamethoprim Ds] Rash    Clindamycin Hives     Prior to Admission medications    Medication Sig Start Date End Date Taking? Authorizing Provider   methadone (DOLOPHINE) 10 mg tablet Take 110 mg by mouth daily. Indications: OPIOID DEPENDENCE   Yes Historical Provider   PNV no.24-iron-folic acid-dha (PRENATAL DHA+COMPLETE PRENATAL) -300 mg-mcg-mg cmpk Take 1 Tab by mouth daily. Yes Historical Provider   acetaminophen (TYLENOL ARTHRITIS PAIN) 650 mg CR tablet Take 650 mg by mouth every six (6) hours as needed. Yes Phys Chris, MD   polyethylene glycol (MIRALAX) 17 gram/dose powder Take 17 g by mouth daily.  1 tablespoon with 8 oz of water daily 10/13/12  Yes Lito Swift MD   HYDROcodone-acetaminophen (VICODIN) 5-500 mg per tablet Take 1 Tab by mouth every six (6) hours as needed for Pain. 10/13/12   Lito Swift MD        Review of Systems: A comprehensive review of systems was negative except for that written in the HPI. Objective:     Vitals:  Vitals:    01/04/17 0147 01/04/17 0148 01/04/17 0151 01/04/17 0152   BP:  (!) 87/41 93/49    Pulse:  63  (!) 57   SpO2: 100%   100%   Weight:       Height:            Physical Exam:  General NAD  CVS: RRR no r/mg  Pulmonary: CTAB  Abdm: gravid NT  Back: EDGAR CVA NT, spine NT  EDGAR LE NT w/o edema  [place]: approx Q3-5, not tracing well all the time  Membranes:  Intact  Fetal Heart Rate: Reactive  Baseline: 150 per minute  Variability: moderate  Accelerations: yes  Decelerations: variable  SVE:lip on right, not vertex presenting  EFW: small, approx 6cm above fundus  Bedside scan marissa breech    Prenatal Labs:   No results found for: RUBELLAEXT, GRBSEXT, HBSAGEXT, HIVEXT, RPREXT, GONNOEXT, CHLAMEXT     Assessment/Plan:     Plan: Admit for IUP 37wks active labor nearly stage 2 marissa breech, pregnancy complicated by scant/ noncompliant care, methadone use and h/o heroin abuse. IUGR/ SGA/ element of growth restriction suspected. FWB overall reassuring w/ cat 2 tracing w/ variables. Breech on scan now- reommend c/s for delivery now, PARQ held w/ patient and family re: indications/ risks/benefits/ expectations/ logistics, including but not limited to bleeding/ infection/ damange to internal and surrounding organs, blood transfusion, thrombosis, issues w/ pain management given history. Plan SW consult and NICU at delivery given methadone use and suspected growth restruction. All ques answered, pt and family understand and agree. .  Group B Strep was pending from last night.     Signed By:  Elizabeth Mcmahan MD     January 4, 2017

## 2017-01-04 NOTE — PROGRESS NOTES
8910  Bedside and Verbal shift change report given to Kathalene Jeans, RN (oncoming nurse) by Demond Zabala RN (offgoing nurse). Report included the following information SBAR, Kardex, OR Summary, Procedure Summary, Intake/Output, MAR, Recent Results and Med Rec Status.

## 2017-01-04 NOTE — DISCHARGE INSTRUCTIONS
Discharge Instructions for  Section    Patient ID:  Royal Pearson  [id number]  31 y.o.  1990    Take Home Medications     Please call office to see Dr Joelle Dobbs in 7-10 days, [phone]    Continue taking your prenatal vitamins if you are breastfeeding. Follow-up care is a key part of your treatment and safety. Be sure to keep all your scheduled appointments    Activity  1. Avoid heavy lifting greater than 10lbs for 2 weeks after your surgery  2. Pelvic rest for 6 weeks, ie, nothing in your vagina for 6 weeks  (no intercourse, tampons, or douching). No tubs for 6 weeks. 3. No driving for 2 weeks and until you are no longer taking prescription pain medications and you can put your foot on the break in a hurry   4. Limit climbing stairs to only when necessary the first 1-2 weeks. Hold the railing and do not carry your baby up and downstairs at first for safety   5. You may walk as tolerated, though be care to not over-do it. Walking will assist in overall healing, decrease constipation and bloating. Monica Pa feel better more quickly with some daily movement. Diet  Regular diet as tolerated    Wound care  There are several types of incision closures. 1. If you have metal staples in place when you leave the hospital, please call your doctors office to schedule the staple removal as directed at discharge. 2. If you have steri strips covering your incision, you may remove them as they fall off or be sure to remove them about one week after your surgery. Removing them in the shower may be easier. 3. If you have Dermabond, or skin glue, covering your incision, it will fall off slowly in the next few weeks. You may remove it as it begins to peel off. Additional wound care  1. Clear or reddish drainage from your incision is normal.  It's best to leave it open to the air, but if there is drainage, you may cover the incision lightly with gauze, preferably without tape.    2.  Keep the incision clean and dry.    3. Numbness of the skin at or around your incision is normal and the feeling usually returns gradually. 4. Call your doctor if you have increasing drainage from your incision, an unpleasant odor, red streaks, an increase in pain, or if it appears to open. Pain Management  1. Continue prescription pain medication as written by discharging physician  2. Over the counter medications such as Tylenol and ibuprofen (Motrin or Advil) are also ideal.  These may be taken together or in alternating doses. You may  take the maximum dose:  Motrin or Advil (generic ibuprofen), either 3 tablets every 6 hours or 4 tablets every 8 hours. Your prescription medication conntains a narcotic mixed with Tylenol,so  you should not take any extra Tylenol or acetaminophen until you have reduced your prescription pain medication. The maximum dose is Tylenol or acetominophen 1000 mg every 6 hours (equivalent to 2 Extra Strength Tylenols every 6 hours). 3. After a few days, begin to replace the prescription medication with over the counter medications. Use the prescription medication if needed for more severe pain or at night. The prescription medication can be addictive if overused. 4. Add heating pad or sitz baths as needed. Constipation  1. Constipation is normal after surgery, especially while taking prescription narcotic pain medication. 2. Over the counter remedies including ducosate (Colace), take 1-2 capsules 1-2 times daily for soft stool as needed. You may also add/ try milk of magnesia or rectal remedies such as Dulcolax or Fleets enema. Recovery: What to Expect at Home  1. Fatigue is expected. Try to rest when you can and don't worry about doing housework or other tasks which can wait. 2. The soreness in your incision will improve significantly over the first 2 weeks, but it may take 6-8 weeks before you are completely recovered.   3. Back pain or general body aches or muscle soreness are expected and should improve with acetominophen or ibuprofen. 4. Leg swelling due to pregnancy and/or IV fluids given in the hospital will take about two weeks to resolve. 5. Most women experience some form of the \"Baby Blues\" after having a baby. Feeling emotional, tearful, frustrated, anxious, sad, and irritable some of the time is normal and go away after about 2 weeks. Adequate rest and help from your family will help. Take breaks from caring for the baby. Call your doctor if your symptoms seem severe, last more than 2 weeks, or seem to be getting worse instead of better. Get help immediately if you have thoughts of wanting to hurt yourself or others! Call your doctor or seek immediate medical care if you have:  Heavy vaginal bleeding, soaking through one or more pads an hour for several hours. Foul-smelling discharge from your vagina or incision. Consistent nausea and vomiting and cannot keep fluids down. Consistent pain that does not get better after you take pain medicine.   Sudden chest pain and shortness of breath  Signs of a blood clot: pain/ swelling/ increasing redness in your lower extremeties  Signs of infection: increased pain in your abdomen or vaginal area; red streaks, warmth, or tenderness of your breasts; fever of 100.5 F or greater

## 2017-01-04 NOTE — PROGRESS NOTES
17 11:08 AM  CM met with JOSE to complete initial assessment, begin discharge planning, and address concerns regarding PNC and methadone use. MOB demographics reviewed and updated. JOSE lives with her boyfriend/FOB Clayton Thompson [phone] and her 10year old nephew that she has had full custody of since he was 12 months old. JOSE does not work and this is her first child. JOSE's boyfriend works and will have a few days off from work. Patient has car seat, crib, clothing, and other necessary supplies; JOSE does feel like she needs some preemie clothes and will have her sister purchase these items and bring them to the hospital.  Pediatric care will be provided by [facility]. JOSE does not currently have [facility], but she is aware of the process to obtain and plans to enroll in these services. JOSE had Medicaid and is aware of the process to add the . JOSE plans to breast and bottlefeed; MOB aware that baby is having some disorganized feedings and will work with lactation on establishing a long term feeding plan. MOB aware that she can obtain a breast pump through [facility] or through Medicaid. There is a support system of family and friends, including patient's sister and uncle, to assist as needed. JOSE is enrolled in the Medication Assisted Treatment (methadone) program at [address] [place] [address]; her counselor is Imani Tim. JOSE has been attending this program since summer 2016, before that she attended the methadone program at [facility] for 4 years. JOSE discussed that she used heroine for about 6 months and then began receiving methadone treatment. JOSE current dose is 110mg liquid daily. Forensic RN at [facility] contacted for data purposes. [facility] contacted; however, they did not screen this case in due to [facility] receiving treatment. Due to JOSE being enrolled in a treatment program, [facility] not contacted.     JOSE discussed that her lack of consistent [facility] was due to issues with transportation. CM emphasized the importance of maintaining all follow up appointments for herself and the baby from now on. MOB reminded that she can use [employer] or [employer] transportation services. Care Management Interventions  Transition of Care Consult (CM Consult): Discharge Planning, Other (Methadone, scant PNC, h/o drug use)  Current Support Network:  Other (Lives with boyfriend/FOB)  Confirm Follow Up Transport: Family  Plan discussed with Pt/Family/Caregiver: Yes  Discharge Location  Discharge Placement: [address] [place] [address], [facility]

## 2017-01-04 NOTE — ED TRIAGE NOTES
Patient arrives with SALLY 1/21/17, first baby, - ROM, OBGYN Dr. Obie Luna, + contractions; Spoke to [facility] [facility] on L&D who states to send patient up, they are expecting her.

## 2017-01-05 LAB
ERYTHROCYTE [DISTWIDTH] IN BLOOD BY AUTOMATED COUNT: 14 % (ref 11.5–14.5)
HCT VFR BLD AUTO: 32.5 % (ref 35–47)
HGB BLD-MCNC: 10.4 G/DL (ref 11.5–16)
MCH RBC QN AUTO: 26.9 PG (ref 26–34)
MCHC RBC AUTO-ENTMCNC: 32 G/DL (ref 30–36.5)
MCV RBC AUTO: 84 FL (ref 80–99)
PLATELET # BLD AUTO: 222 K/UL (ref 150–400)
RBC # BLD AUTO: 3.87 M/UL (ref 3.8–5.2)
WBC # BLD AUTO: 13.8 K/UL (ref 3.6–11)

## 2017-01-05 PROCEDURE — 85027 COMPLETE CBC AUTOMATED: CPT | Performed by: OBSTETRICS & GYNECOLOGY

## 2017-01-05 PROCEDURE — 65270000029 HC RM PRIVATE

## 2017-01-05 PROCEDURE — 74011250637 HC RX REV CODE- 250/637: Performed by: OBSTETRICS & GYNECOLOGY

## 2017-01-05 PROCEDURE — 74011250636 HC RX REV CODE- 250/636: Performed by: OBSTETRICS & GYNECOLOGY

## 2017-01-05 PROCEDURE — 36415 COLL VENOUS BLD VENIPUNCTURE: CPT | Performed by: OBSTETRICS & GYNECOLOGY

## 2017-01-05 RX ADMIN — METHADONE HYDROCHLORIDE 110 MG: 10 CONCENTRATE ORAL at 05:50

## 2017-01-05 RX ADMIN — KETOROLAC TROMETHAMINE 30 MG: 30 INJECTION, SOLUTION INTRAMUSCULAR at 01:57

## 2017-01-05 RX ADMIN — HYDROCODONE BITARTRATE AND ACETAMINOPHEN 2 TABLET: 5; 325 TABLET ORAL at 05:20

## 2017-01-05 RX ADMIN — IBUPROFEN 800 MG: 800 TABLET, FILM COATED ORAL at 13:30

## 2017-01-05 RX ADMIN — HYDROCODONE BITARTRATE AND ACETAMINOPHEN 2 TABLET: 5; 325 TABLET ORAL at 13:39

## 2017-01-05 RX ADMIN — HYDROCODONE BITARTRATE AND ACETAMINOPHEN 2 TABLET: 5; 325 TABLET ORAL at 21:36

## 2017-01-05 RX ADMIN — DOCUSATE SODIUM 100 MG: 100 CAPSULE ORAL at 13:30

## 2017-01-05 NOTE — PROGRESS NOTES
Pt crying, infant is being transitioned to NICU due to methadone withdrawal.  Verbalizes she knew this may happen but \"hearing it\" was difficult. Allowed patient to verbalize. Discussed with patient this is a good opportunity for her to make positive life changes for her son. Discussed about having trisha. Verbalizes her uncle is bringing her \"son\" (her brothers son). Informed patient the  is available 24/7.

## 2017-01-05 NOTE — LACTATION NOTE
Guidelines for pumping, milk collection and storage, proper cleaning of pump parts all reviewed. How to establish and maintain breast milk supply through pumping reviewed. Differences between hospital grade rental pumps vs store bought double electric/hand pumps discussed. Set up pumping with double electric set up. Assisted with pump session. List of area pump rental locations and lactation support services provided. Showed Mom how to use pump. Gave her a sheet on manual expression, lactation cookie recipe and pump log. Discussed getting pump from [facility]. Told Mom to call [facility] to get appointment for a pump.   Instructed Mom to call [facility] when she gets ready to feed

## 2017-01-05 NOTE — PROGRESS NOTES
Pt and some started to go out of door for a walk.   Pt read, verbalizes understanding, and read \"The serves as a written release for privileges to leave inpatient clinical unit during hospital stay\"

## 2017-01-05 NOTE — PROGRESS NOTES
Post-Operative  Day 1    Nguyen Paula         Information for the patient's : Dashawn Rivero, Female [[id number]]   , Low Transverse     Patient doing well without unusual complaints. Ambulates, tolerates po, and voids without difficulty. Vitals:  Visit Vitals    /56    Pulse 80    Temp 98 °F (36.7 °C)    Resp 16    Ht 5' 7\" (1.702 m)    Wt 56.2 kg (124 lb)    SpO2 97%    Breastfeeding Unknown    BMI 19.42 kg/m2     Temp (24hrs), Av °F (36.7 °C), Min:98 °F (36.7 °C), Max:98 °F (36.7 °C)      Last 24hr Input/Output:    Intake/Output Summary (Last 24 hours) at 17 09  Last data filed at 17   Gross per 24 hour   Intake                0 ml   Output             3050 ml   Net            -3050 ml      Exam:       Patient without distress. Abdomen:soft, expected mild tenderness, fundus firm @U-2  Inc: dressing c/d/i  Lower extremities are no tenderness without edema. Labs:   Lab Results   Component Value Date/Time    WBC 13.8 2017 05:13 AM    WBC 21.5 2017 12:36 AM    WBC 6.0 10/13/2012 12:40 PM    HGB 10.4 2017 05:13 AM    HGB 14.0 2017 12:36 AM    HGB 14.5 10/13/2012 12:40 PM    HCT 32.5 2017 05:13 AM    HCT 39.1 2017 12:36 AM    HCT 41.7 10/13/2012 12:40 PM    PLATELET 018  05:13 AM    PLATELET 656  12:36 AM    PLATELET 626  12:40 PM    Hgb, External 11.3 2016 08:00 AM    Hct, External 33.5 2016 08:00 AM       Recent Results (from the past 24 hour(s))   CBC W/O DIFF    Collection Time: 17  5:13 AM   Result Value Ref Range    WBC 13.8 (H) 3.6 - 11.0 K/uL    RBC 3.87 3.80 - 5.20 M/uL    HGB 10.4 (L) 11.5 - 16.0 g/dL    HCT 32.5 (L) 35.0 - 47.0 %    MCV 84.0 80.0 - 99.0 FL    MCH 26.9 26.0 - 34.0 PG    MCHC 32.0 30.0 - 36.5 g/dL    RDW 14.0 11.5 - 14.5 %    PLATELET 554 682 - 507 K/uL     Assessment: Post-Op day 1, stable; O+/RI    Plan:   1. Routine post-operative care   2.  Infant be transferred to NICU today for increased withdrawal symptoms.

## 2017-01-05 NOTE — PROGRESS NOTES
1900 Assumed care of pt at this time from [address] [place] [address], Ciarra Car RN.  2484 Bedside shift change report given to YELENA Whittaker RN (oncoming nurse) by CORNELIO Chavez RN (offgoing nurse). Report included the following information SBAR, Kardex, Intake/Output, MAR and Recent Results.

## 2017-01-05 NOTE — PROGRESS NOTES
Fount patient entering kitchen galley across nurses station with infant in arms. Reminded patient for infant safety baby travels in bassinet and not in arms. Verbalizes understanding. Ambulated back to room with patient.

## 2017-01-06 LAB
BACTERIA SPEC CULT: NORMAL
SERVICE CMNT-IMP: NORMAL

## 2017-01-06 PROCEDURE — 74011250637 HC RX REV CODE- 250/637: Performed by: OBSTETRICS & GYNECOLOGY

## 2017-01-06 PROCEDURE — 65270000029 HC RM PRIVATE

## 2017-01-06 RX ADMIN — IBUPROFEN 800 MG: 800 TABLET, FILM COATED ORAL at 20:16

## 2017-01-06 RX ADMIN — DOCUSATE SODIUM 100 MG: 100 CAPSULE ORAL at 11:00

## 2017-01-06 RX ADMIN — DOCUSATE SODIUM 100 MG: 100 CAPSULE ORAL at 20:17

## 2017-01-06 RX ADMIN — HYDROCODONE BITARTRATE AND ACETAMINOPHEN 2 TABLET: 5; 325 TABLET ORAL at 03:32

## 2017-01-06 RX ADMIN — IBUPROFEN 800 MG: 800 TABLET, FILM COATED ORAL at 11:00

## 2017-01-06 RX ADMIN — METHADONE HYDROCHLORIDE 110 MG: 10 CONCENTRATE ORAL at 07:22

## 2017-01-06 NOTE — PROGRESS NOTES
0315:  Shift report received from [address], RN. RN assumes care of patient at this time. 0715: Bedside shift report given to TAMI Marinelli, [address]

## 2017-01-06 NOTE — PROGRESS NOTES
PostPartum Note    Hemant Villasenor  [id number]  1990  31 y.o.    S:  Ms. Hemant Villasenor is a 31 y.o.  POD #2 s/p LTCS for breech @ 37w4d. Doing well. She had a baby girl who is in the NICU. Her lochia is like a period. She describes her pain as mild and is well controlled with PO medications. She is pumping and this is going well. She is ambulating and voiding. Tolerating PO intake. O:   Visit Vitals    /75    Pulse 64    Temp 98.5 °F (36.9 °C)    Resp 16    Ht 5' 7\" (1.702 m)    Wt 56.2 kg (124 lb)    SpO2 99%    Breastfeeding Unknown    BMI 19.42 kg/m2       Lab Results   Component Value Date/Time    WBC 13.8 2017 05:13 AM    HGB 10.4 2017 05:13 AM    HCT 32.5 2017 05:13 AM    PLATELET 697  05:13 AM    MCV 84.0 2017 05:13 AM    Hgb, External 11.3 2016 08:00 AM    Hct, External 33.5 2016 08:00 AM       Gen - No acute distress  Abdomen - Fundus firm, below the umbilicus; incision c/d/i    Ext - Warm, well perfused. Nontender    A/P:  POD #2 s/p LTCS @ 37w4d doing well. 1.  Routine PP instructions/ care discussed  2. Blood type - Rh +  3. Continue methadone   4. Circumcision n/a    5. Discharge POD#3    6. F/U 4-6 weeks for PP check.       Imer Manning MD  [facility]

## 2017-01-06 NOTE — PROGRESS NOTES
1900  Bedside and Verbal shift change report given to Roosevelt Claudia RN (oncoming nurse) by Yudith Nogueira RN (offgoing nurse). Report included the following information SBAR, Kardex, Intake/Output, MAR, Recent Results and Med Rec Status. Patient off the unit at change of shift. 1930  Patient returned to unit, no needs expressed at this time. Vital signs obtained, patient happy and calm. 2030  Patient up to NICU at this time.

## 2017-01-06 NOTE — PROGRESS NOTES
Pt evaluated for abdominal pain on L&D,  Cervix 1cm dilated and did not change. Contractions spaced after IV hydration. She was given Rocephin IM for UTI.  She was triaged by RN and cleared for discharge over the phone    Danielle Florence DO

## 2017-01-06 NOTE — LACTATION NOTE
Mom in NICU. Mom has been pumping copious amounts of milk. Spoke with Mom about pumping when she is discharged. Spoke with nurse about getting a prescrit prescription for a breast pump. Told Mom she could use our pumps when she is here visiting the baby.

## 2017-01-06 NOTE — PROGRESS NOTES
1520 verbal and bedside report received from TAMI Cornelius RN. Patient care assumed at this time. 1910 Bedside and Verbal shift change report given to SAMEER White (oncoming nurse) by Brandon Guevara RN (offgoing nurse). Report included the following information SBAR, Kardex, MAR, Accordion, Recent Results and Med Rec Status.

## 2017-01-07 VITALS
WEIGHT: 124 LBS | TEMPERATURE: 98.8 F | OXYGEN SATURATION: 99 % | HEIGHT: 67 IN | BODY MASS INDEX: 19.46 KG/M2 | DIASTOLIC BLOOD PRESSURE: 60 MMHG | RESPIRATION RATE: 18 BRPM | HEART RATE: 64 BPM | SYSTOLIC BLOOD PRESSURE: 104 MMHG

## 2017-01-07 PROCEDURE — 74011250637 HC RX REV CODE- 250/637: Performed by: OBSTETRICS & GYNECOLOGY

## 2017-01-07 RX ORDER — HYDROCODONE BITARTRATE AND ACETAMINOPHEN 5; 325 MG/1; MG/1
1 TABLET ORAL
Qty: 20 TAB | Refills: 0 | Status: SHIPPED | OUTPATIENT
Start: 2017-01-07 | End: 2017-06-30

## 2017-01-07 RX ADMIN — METHADONE HYDROCHLORIDE 110 MG: 10 CONCENTRATE ORAL at 05:58

## 2017-01-07 RX ADMIN — DOCUSATE SODIUM 100 MG: 100 CAPSULE ORAL at 08:47

## 2017-01-07 RX ADMIN — IBUPROFEN 800 MG: 800 TABLET, FILM COATED ORAL at 05:58

## 2017-01-07 NOTE — PROGRESS NOTES
PostPartum Note    Hamlet Herrera  [id number]  1990  31 y.o.    S:  Ms. Hamlet Herrera is a 31 y.o.  POD #3 s/p LTCS @ 37w4d. Doing well. She had a baby girl who is in the NICU. Her lochia is like a period. She describes her pain as mild and is well controlled with PO medications. She is breast pumping and this is going extremely well. She is ambulating and voiding. Tolerating PO intake. O:   Visit Vitals    /61    Pulse 71    Temp 98.2 °F (36.8 °C)    Resp 14    Ht 5' 7\" (1.702 m)    Wt 56.2 kg (124 lb)    SpO2 99%    Breastfeeding Unknown    BMI 19.42 kg/m2       Lab Results   Component Value Date/Time    WBC 13.8 2017 05:13 AM    HGB 10.4 2017 05:13 AM    HCT 32.5 2017 05:13 AM    PLATELET 761 25/15/2041 05:13 AM    MCV 84.0 2017 05:13 AM    Hgb, External 11.3 2016 08:00 AM    Hct, External 33.5 2016 08:00 AM       Gen - No acute distress  Abdomen - Fundus firm, below the umbilicus; incision c/d/i    Ext - Warm, well perfused. Nontender    A/P:  POD #3 s/p LTCS  @ 37w4d doing well. 1.  Routine PP instructions/ care discussed  2. Blood type - Rh +  3. Continue methadone as outpatient at clinic   4. Circumcision n/a    5. Discharge today    6. F/U 4-6 weeks for PP check.       Ryan Ybarra MD  [facility]

## 2017-01-07 NOTE — LACTATION NOTE
Mom states hasn't put baby to breast yet. Baby in NICU. Has been pumping and now getting about 2 oz from each breast each time she pumps. Mom being discharged today, and discussed how she was going to pump tonight. Mom shown how to use hand pump, and was hoping to get a pump though her insurance company. Called [facility] pharmacy, but they are closed today. Discussed renting a pump. Mom to discussed with FOB and will call me if she wants to rent one.

## 2017-01-07 NOTE — ROUTINE PROCESS
2000: Bedside and Verbal shift change report given to YELENA Talavera (oncoming nurse) by Santiago Connell (offgoing nurse). Report included the following information SBAR, Kardex, Intake/Output, MAR and Recent Results     0700: Bedside and Verbal shift change report given to Paul Carmona rn (oncoming nurse) by Tomi Separ (offgoing nurse). Report included the following information SBAR, Kardex, Intake/Output, MAR and Recent Results.

## 2017-01-07 NOTE — PROGRESS NOTES
1110 Postpartum discharge instructions given. Discussed wound care, s/s of infection, pain management, normal/abnormal bleeding, activity, diet, pp depression and when to call Doctor. Vss upon discharge. Pt to follow up in 4-6weeks. Prescriptions given. Pt verbalized understanding and had no further questions.

## 2017-06-30 ENCOUNTER — HOSPITAL ENCOUNTER (EMERGENCY)
Age: 27
Discharge: HOME | End: 2017-06-30
Attending: EMERGENCY MEDICINE
Payer: MEDICAID

## 2017-06-30 DIAGNOSIS — Z01.89 ENCOUNTER FOR LABORATORY TEST: Primary | ICD-10-CM

## 2017-06-30 PROCEDURE — 99282 EMERGENCY DEPT VISIT SF MDM: CPT

## 2017-06-30 RX ORDER — SERTRALINE HYDROCHLORIDE 50 MG/1
50 TABLET, FILM COATED ORAL DAILY
COMMUNITY

## 2017-06-30 NOTE — DISCHARGE INSTRUCTIONS
We hope that we have addressed all of your medical concerns. The examination and treatment you received in the Emergency Department were for an emergent problem and were not intended as complete care. It is important that you follow up with your healthcare provider(s) for ongoing care. If your symptoms worsen or do not improve as expected, and you are unable to reach your usual health care provider(s), you should return to the Emergency Department. Today's healthcare is undergoing tremendous change, and patient satisfaction surveys are one of the many tools to assess the quality of medical care. You may receive a survey from the [employer] regarding your experience in the Emergency Department. I hope that your experience has been completely positive, particularly the medical care that I provided. As such, please participate in the survey; anything less than excellent does not meet my expectations or intentions. [facility] and [address] [place] [address] participate in nationally recognized quality of care measures. If your blood pressure is greater than 120/80, as reported below, we urge that you seek medical care to address the potential of high blood pressure, commonly known as hypertension. Hypertension can be hereditary or can be caused by certain medical conditions, pain, stress, or \"white coat syndrome. \"       Please make an appointment with your health care provider(s) for follow up of your Emergency Department visit. VITALS:   No data found. Thank you for allowing us to provide you with medical care today. We realize that you have many choices for your emergency care needs. Please choose us in the future for any continued health care needs. Zohaib Banegas, [facility] Aj Diggs: [phone]            No results found for this or any previous visit (from the past 24 hour(s)). No results found.

## 2017-06-30 NOTE — ED NOTES
Pt's discharge papers given to [facility], he verbalizes an understanding, pt stable at time of discharge ambulates to EMS exit with .

## 2017-06-30 NOTE — ED NOTES
Blood drawn with kit provided by [facility]. Right A/C prepped with iodine, butterfly needle with Vacutainer, 2 gray top tubes drawn to fill clive, red specimen seal applied over top of tubes, labs from kit applied and box sealed. Pt tolerates procedure well.

## 2017-06-30 NOTE — ED TRIAGE NOTES
Pt arrives with 's dept for blood draw for them.   Pt is tearful upon arrival.  Shellie TOLBERT into evaluate
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
## 2021-03-15 ENCOUNTER — TRANSCRIBE ORDER (OUTPATIENT)
Dept: SCHEDULING | Age: 31
End: 2021-03-15

## 2021-03-15 DIAGNOSIS — N63.0 LUMP OR MASS IN BREAST: Primary | ICD-10-CM

## 2021-03-17 ENCOUNTER — TRANSCRIBE ORDER (OUTPATIENT)
Dept: SCHEDULING | Age: 31
End: 2021-03-17

## 2021-03-17 DIAGNOSIS — N63.0 LUMP OR MASS IN BREAST: Primary | ICD-10-CM
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
## 2021-05-05 ENCOUNTER — OFFICE VISIT (OUTPATIENT)
Dept: OBGYN CLINIC | Age: 31
End: 2021-05-05
Payer: MEDICAID

## 2021-05-05 VITALS
DIASTOLIC BLOOD PRESSURE: 50 MMHG | HEIGHT: 67 IN | SYSTOLIC BLOOD PRESSURE: 100 MMHG | WEIGHT: 116 LBS | BODY MASS INDEX: 18.21 KG/M2

## 2021-05-05 DIAGNOSIS — T74.22XA CONFIRMED VICTIM OF SEXUAL ABUSE IN CHILDHOOD, INITIAL ENCOUNTER: ICD-10-CM

## 2021-05-05 DIAGNOSIS — R87.610 ATYPICAL SQUAMOUS CELLS OF UNDETERMINED SIGNIFICANCE (ASCUS) ON PAPANICOLAOU SMEAR OF CERVIX: Primary | ICD-10-CM

## 2021-05-05 DIAGNOSIS — R87.618 PAP SMEAR ABNORMALITY OF CERVIX/HUMAN PAPILLOMAVIRUS (HPV) POSITIVE: ICD-10-CM

## 2021-05-05 PROCEDURE — 99203 OFFICE O/P NEW LOW 30 MIN: CPT | Performed by: OBSTETRICS & GYNECOLOGY

## 2021-05-05 PROCEDURE — 57454 BX/CURETT OF CERVIX W/SCOPE: CPT | Performed by: OBSTETRICS & GYNECOLOGY

## 2021-05-05 NOTE — PATIENT INSTRUCTIONS
Colposcopy: What to Expect at [facility] Your Recovery You may feel some soreness in your vagina for a day or two if you had a biopsy. Some vaginal bleeding or discharge is normal for up to a week after a biopsy. The discharge may be dark-colored if a solution was put on your cervix. You can use a sanitary pad for the bleeding. It may take a week or two for you to get the test results. This care sheet gives you a general idea about how long it will take for you to recover. But each person recovers at a different pace. Follow the steps below to feel better as quickly as possible. How can you care for yourself at home? Activity 
  · You can return to work and most daily activities right after the test.  
Exercise 
  · Do not exercise for 1 day after the test.  
Medicines 
  · Your doctor will tell you if and when you can restart your medicines. You will also get instructions about taking any new medicines.  
  · If you take aspirin or some other blood thinner, ask your doctor if and when to start taking it again. Make sure that you understand exactly what your doctor wants you to do.  
  · Take an over-the-counter pain medicine, such as acetaminophen (Tylenol), ibuprofen (Advil, Motrin), or naproxen (Aleve). Be safe with medicines. Read and follow all instructions on the label. Do not take two or more pain medicines at the same time unless the doctor told you to. Many pain medicines have acetaminophen, which is Tylenol. Too much acetaminophen (Tylenol) can be harmful. Other instructions 
  · Some vaginal bleeding or discharge is normal for up to a week after a biopsy. The discharge may be dark-colored. Use a pad if you have some bleeding.  
  · Do not douche, have sexual intercourse, or use tampons for 1 week if you had a biopsy. This will allow time for your cervix to heal.  
  · You can take a bath or shower anytime after the test.  
Follow-up care is a key part of your treatment and safety.  Be sure to make and go to all appointments, and call your doctor if you are having problems. It's also a good idea to know your test results and keep a list of the medicines you take. When should you call for help? Call your doctor now or seek immediate medical care if: 
  · You have severe vaginal bleeding. This means that you are soaking through your usual pads or tampons each hour for 2 or more hours.  
  · You have pain that does not get better after you take pain medicine.  
  · You have signs of infection, such as: 
? Increased pain. ? Bad-smelling vaginal discharge. ? A fever. Watch closely for any changes in your health, and be sure to contact your doctor if: 
  · You have questions or concerns. Where can you learn more? Go to http://www.gray.com/ Enter M523 in the search box to learn more about \"Colposcopy: What to Expect at Home. \" Current as of: December 17, 2020               Content Version: 12.8 © 2006-2021 [employer]. Care instructions adapted under license by [employer] (which disclaims liability or warranty for this information). If you have questions about a medical condition or this instruction, always ask your healthcare professional. [address] any warranty or liability for your use of this information.

## 2021-05-11 LAB
CPT CODES, 490044: ABNORMAL
CPT DISCLAIMER: ABNORMAL
CYTOLOGY SPEC DOC CYTO: ABNORMAL
DIAGNOSIS SYNOPSIS:: ABNORMAL
DX ICD CODE: ABNORMAL
DX ICD CODE: ABNORMAL
PATH REPORT.GROSS SPEC: ABNORMAL
PATHOLOGIST NAME: ABNORMAL
SPECIMEN SOURCE: ABNORMAL

## 2022-03-19 PROBLEM — Z34.90 PREGNANT: Status: ACTIVE | Noted: 2017-01-04

## 2023-05-26 RX ORDER — METHADONE HYDROCHLORIDE 10 MG/1
110 TABLET ORAL DAILY
COMMUNITY

## (undated) DEVICE — BARRIER TISS ADH ABSRB 3X4IN -- GYNECARE INTERCEED

## (undated) DEVICE — SOLUTION IV 1000ML 0.9% SOD CHL

## (undated) DEVICE — TRAY CATH OD16FR SIL URIN M STATLOK STBL DEV SURSTP

## (undated) DEVICE — 3000CC GUARDIAN II: Brand: GUARDIAN

## (undated) DEVICE — TIP CLEANER: Brand: VALLEYLAB

## (undated) DEVICE — SPONGE LAP 18X18IN STRL -- 5/PK

## (undated) DEVICE — C-SECTION II-LF: Brand: MEDLINE INDUSTRIES, INC.

## (undated) DEVICE — (D)STRIP SKN CLSR 0.5X4IN WHT --

## (undated) DEVICE — (D)GLOVE SURG ORTH 6.5 PWD LTX -- DISC BY MFR USE ITEM 278012

## (undated) DEVICE — SLEEVE COMPR STD 12 IN FOR 165IN CALF COMFORT VENODYNE SYS

## (undated) DEVICE — BULB SYRINGE, IRRIGATION WITH PROTECTIVE CAP, 60 CC, INDIVIDUALLY WRAPPED: Brand: DOVER

## (undated) DEVICE — REM POLYHESIVE ADULT PATIENT RETURN ELECTRODE: Brand: VALLEYLAB

## (undated) DEVICE — SUTURE VCRL SZ 2-0 L36IN ABSRB UD L36MM CT-1 1/2 CIR J945H

## (undated) DEVICE — TOWEL,OR,DSP,ST,BLUE,STD,2/PK,40PK/CS: Brand: MEDLINE

## (undated) DEVICE — SUTURE VCRL SZ 0 L36IN ABSRB VLT L40MM CT 1/2 CIR J358H

## (undated) DEVICE — ROCKER SWITCH PENCIL HOLSTER: Brand: VALLEYLAB

## (undated) DEVICE — KENDALL SCD EXPRESS SLEEVES, KNEE LENGTH, MEDIUM: Brand: KENDALL SCD

## (undated) DEVICE — (D)PREP SKN CHLRAPRP APPL 26ML -- CONVERT TO ITEM 371833

## (undated) DEVICE — BLADE SURG UNIV BLUE --

## (undated) DEVICE — 3M™ MEDIPORE™ H SOFT CLOTH SURGICAL TAPE, 2863, 3 IN X 10 YD, 12/CASE: Brand: 3M™ MEDIPORE™

## (undated) DEVICE — SUTURE VCRL 3-0 L36IN ABSRB UD CT L40MM 1/2 CIR TAPERPOINT J956H

## (undated) DEVICE — SUTURE MCRYL SZ 4 0 L18IN ABSRB VLT PS 1 L24MM 3 8 CIR REV Y682H

## (undated) DEVICE — SOLIDIFIER FLUID 3000 CC ABSORB

## (undated) DEVICE — TELFA ADHESIVE ISLAND DRESSING: Brand: TELFA

## (undated) DEVICE — Z INACTIVE PER BARD TRAY CATH W/ 16FR DRNGE BG STATLOK F STBL DEV W/